# Patient Record
Sex: MALE | Race: BLACK OR AFRICAN AMERICAN | NOT HISPANIC OR LATINO | Employment: OTHER | URBAN - METROPOLITAN AREA
[De-identification: names, ages, dates, MRNs, and addresses within clinical notes are randomized per-mention and may not be internally consistent; named-entity substitution may affect disease eponyms.]

---

## 2017-08-27 ENCOUNTER — HOSPITAL ENCOUNTER (EMERGENCY)
Facility: HOSPITAL | Age: 52
Discharge: OTHER INSTITUTION | End: 2017-08-28
Attending: EMERGENCY MEDICINE
Payer: MEDICAID

## 2017-08-27 DIAGNOSIS — R45.851 SUICIDAL IDEATION: Primary | ICD-10-CM

## 2017-08-27 DIAGNOSIS — E87.6 HYPOKALEMIA: ICD-10-CM

## 2017-08-27 DIAGNOSIS — F10.10 ALCOHOL ABUSE: ICD-10-CM

## 2017-08-27 PROCEDURE — 96372 THER/PROPH/DIAG INJ SC/IM: CPT

## 2017-08-27 PROCEDURE — 99285 EMERGENCY DEPT VISIT HI MDM: CPT | Mod: 25

## 2017-08-27 RX ORDER — OLANZAPINE 10 MG/2ML
10 INJECTION, POWDER, FOR SOLUTION INTRAMUSCULAR
Status: COMPLETED | OUTPATIENT
Start: 2017-08-28 | End: 2017-08-28

## 2017-08-27 RX ORDER — HALOPERIDOL 10 MG/1
10 TABLET ORAL 4 TIMES DAILY
Status: ON HOLD | COMMUNITY
End: 2017-10-24 | Stop reason: HOSPADM

## 2017-08-27 RX ORDER — DIPHENHYDRAMINE HYDROCHLORIDE 50 MG/ML
50 INJECTION INTRAMUSCULAR; INTRAVENOUS
Status: COMPLETED | OUTPATIENT
Start: 2017-08-28 | End: 2017-08-28

## 2017-08-28 VITALS
WEIGHT: 210 LBS | HEART RATE: 64 BPM | RESPIRATION RATE: 18 BRPM | TEMPERATURE: 98 F | DIASTOLIC BLOOD PRESSURE: 90 MMHG | HEIGHT: 69 IN | OXYGEN SATURATION: 100 % | BODY MASS INDEX: 31.1 KG/M2 | SYSTOLIC BLOOD PRESSURE: 150 MMHG

## 2017-08-28 LAB
ALBUMIN SERPL BCP-MCNC: 3.7 G/DL
ALP SERPL-CCNC: 93 U/L
ALT SERPL W/O P-5'-P-CCNC: 125 U/L
AMPHET+METHAMPHET UR QL: NEGATIVE
ANION GAP SERPL CALC-SCNC: 11 MMOL/L
APAP SERPL-MCNC: <3 UG/ML
AST SERPL-CCNC: 125 U/L
BARBITURATES UR QL SCN>200 NG/ML: NEGATIVE
BASOPHILS # BLD AUTO: 0.03 K/UL
BASOPHILS NFR BLD: 0.3 %
BENZODIAZ UR QL SCN>200 NG/ML: NEGATIVE
BILIRUB SERPL-MCNC: 0.3 MG/DL
BILIRUB UR QL STRIP: NEGATIVE
BUN SERPL-MCNC: 9 MG/DL
BZE UR QL SCN: NEGATIVE
CALCIUM SERPL-MCNC: 10.5 MG/DL
CANNABINOIDS UR QL SCN: NEGATIVE
CHLORIDE SERPL-SCNC: 106 MMOL/L
CLARITY UR: CLEAR
CO2 SERPL-SCNC: 18 MMOL/L
COLOR UR: YELLOW
CREAT SERPL-MCNC: 0.9 MG/DL
CREAT UR-MCNC: 202.8 MG/DL
DIFFERENTIAL METHOD: NORMAL
EOSINOPHIL # BLD AUTO: 0.1 K/UL
EOSINOPHIL NFR BLD: 0.5 %
ERYTHROCYTE [DISTWIDTH] IN BLOOD BY AUTOMATED COUNT: 13.1 %
EST. GFR  (AFRICAN AMERICAN): >60 ML/MIN/1.73 M^2
EST. GFR  (NON AFRICAN AMERICAN): >60 ML/MIN/1.73 M^2
ETHANOL SERPL-MCNC: 210 MG/DL
ETHANOL SERPL-MCNC: 77 MG/DL
GLUCOSE SERPL-MCNC: 104 MG/DL
GLUCOSE UR QL STRIP: NEGATIVE
HCT VFR BLD AUTO: 45.7 %
HGB BLD-MCNC: 15.5 G/DL
HGB UR QL STRIP: NEGATIVE
KETONES UR QL STRIP: NEGATIVE
LEUKOCYTE ESTERASE UR QL STRIP: NEGATIVE
LYMPHOCYTES # BLD AUTO: 3.7 K/UL
LYMPHOCYTES NFR BLD: 33.3 %
MCH RBC QN AUTO: 30.8 PG
MCHC RBC AUTO-ENTMCNC: 33.9 G/DL
MCV RBC AUTO: 91 FL
METHADONE UR QL SCN>300 NG/ML: NEGATIVE
MONOCYTES # BLD AUTO: 0.8 K/UL
MONOCYTES NFR BLD: 7.4 %
NEUTROPHILS # BLD AUTO: 6.5 K/UL
NEUTROPHILS NFR BLD: 58.5 %
NITRITE UR QL STRIP: NEGATIVE
OPIATES UR QL SCN: NEGATIVE
PCP UR QL SCN>25 NG/ML: NEGATIVE
PH UR STRIP: 6 [PH] (ref 5–8)
PLATELET # BLD AUTO: 309 K/UL
PMV BLD AUTO: 10.1 FL
POTASSIUM SERPL-SCNC: 3.2 MMOL/L
PROT SERPL-MCNC: 9.4 G/DL
PROT UR QL STRIP: NEGATIVE
RBC # BLD AUTO: 5.04 M/UL
SODIUM SERPL-SCNC: 135 MMOL/L
SP GR UR STRIP: >=1.03 (ref 1–1.03)
TOXICOLOGY INFORMATION: NORMAL
TSH SERPL DL<=0.005 MIU/L-ACNC: 0.9 UIU/ML
URN SPEC COLLECT METH UR: ABNORMAL
UROBILINOGEN UR STRIP-ACNC: 1 EU/DL
WBC # BLD AUTO: 11.13 K/UL

## 2017-08-28 PROCEDURE — 80320 DRUG SCREEN QUANTALCOHOLS: CPT | Mod: 91

## 2017-08-28 PROCEDURE — 80329 ANALGESICS NON-OPIOID 1 OR 2: CPT

## 2017-08-28 PROCEDURE — 25000003 PHARM REV CODE 250: Performed by: EMERGENCY MEDICINE

## 2017-08-28 PROCEDURE — 80053 COMPREHEN METABOLIC PANEL: CPT

## 2017-08-28 PROCEDURE — S0166 INJ OLANZAPINE 2.5MG: HCPCS | Performed by: EMERGENCY MEDICINE

## 2017-08-28 PROCEDURE — 99284 EMERGENCY DEPT VISIT MOD MDM: CPT | Mod: GT,S$PBB,, | Performed by: PSYCHIATRY & NEUROLOGY

## 2017-08-28 PROCEDURE — 85025 COMPLETE CBC W/AUTO DIFF WBC: CPT

## 2017-08-28 PROCEDURE — 84443 ASSAY THYROID STIM HORMONE: CPT

## 2017-08-28 PROCEDURE — 80307 DRUG TEST PRSMV CHEM ANLYZR: CPT

## 2017-08-28 PROCEDURE — 81003 URINALYSIS AUTO W/O SCOPE: CPT

## 2017-08-28 PROCEDURE — 80320 DRUG SCREEN QUANTALCOHOLS: CPT

## 2017-08-28 PROCEDURE — 63600175 PHARM REV CODE 636 W HCPCS: Performed by: EMERGENCY MEDICINE

## 2017-08-28 RX ORDER — DIAZEPAM 5 MG/1
10 TABLET ORAL EVERY 8 HOURS
Status: DISCONTINUED | OUTPATIENT
Start: 2017-08-28 | End: 2017-08-28 | Stop reason: HOSPADM

## 2017-08-28 RX ORDER — THIAMINE HCL 100 MG
100 TABLET ORAL DAILY
Status: DISCONTINUED | OUTPATIENT
Start: 2017-08-28 | End: 2017-08-28 | Stop reason: HOSPADM

## 2017-08-28 RX ORDER — QUETIAPINE FUMARATE 100 MG/1
100 TABLET, FILM COATED ORAL NIGHTLY
Status: DISCONTINUED | OUTPATIENT
Start: 2017-08-28 | End: 2017-08-28 | Stop reason: HOSPADM

## 2017-08-28 RX ORDER — POTASSIUM CHLORIDE 750 MG/1
10 TABLET, EXTENDED RELEASE ORAL
Status: COMPLETED | OUTPATIENT
Start: 2017-08-28 | End: 2017-08-28

## 2017-08-28 RX ORDER — LORAZEPAM 1 MG/1
1 TABLET ORAL EVERY 4 HOURS PRN
Status: DISCONTINUED | OUTPATIENT
Start: 2017-08-28 | End: 2017-08-28

## 2017-08-28 RX ADMIN — DIAZEPAM 10 MG: 5 TABLET ORAL at 03:08

## 2017-08-28 RX ADMIN — POTASSIUM CHLORIDE 10 MEQ: 750 TABLET, EXTENDED RELEASE ORAL at 10:08

## 2017-08-28 RX ADMIN — DIPHENHYDRAMINE HYDROCHLORIDE 50 MG: 50 INJECTION, SOLUTION INTRAMUSCULAR; INTRAVENOUS at 12:08

## 2017-08-28 RX ADMIN — OLANZAPINE 10 MG: 10 INJECTION, POWDER, FOR SOLUTION INTRAMUSCULAR at 12:08

## 2017-08-28 RX ADMIN — LORAZEPAM 2 MG: 2 INJECTION INTRAMUSCULAR; INTRAVENOUS at 12:08

## 2017-08-28 RX ADMIN — Medication 100 MG: at 03:08

## 2017-08-28 NOTE — ED NOTES
Belongings Labeled with name and placed in bag  Shirt,shorts,has card in pocket,socks ,shoes.  Placed in locker room by Zulema Locker #2

## 2017-08-28 NOTE — ED NOTES
Pt's room secured per protocol. Pt's belongings secured and pt placed in grey gown and yellow socks. Pt being directly monitored by siri Ramsey at this time. Will continue to monitor.  Sleeping soundly

## 2017-08-28 NOTE — PROVIDER PROGRESS NOTES - EMERGENCY DEPT.
4:33 PM:  Patient is being transferred.  Accepting Facility: [facility]  Accepting Physician: Ole Art

## 2017-08-28 NOTE — ED NOTES
Acknowledge transfer of care of patient. Patient resting in bed with no acute distress noted. Patient sleeping, easily aroused. Alert and oriented x 3, [place] and follows commands. No complaints of altered comfort. Respirations easy and unlabored.. Able to make needs known. Bed in low position. Room remains cleared per PEC protocol and patient in cool scrubs. Hannah COKER assisting with observations. Patient remains SI. Will continue to monitor. Patient aware of need for urine specimen.

## 2017-08-28 NOTE — ED PROVIDER NOTES
SCRIBE #1 NOTE: I, Garry Caruso, am scribing for, and in the presence of, Stoney Barron Jr., MD. I have scribed the entire note.      History      Chief Complaint   Patient presents with    Suicidal     SI/HI reports he was going to hurt himself with a gun and his brother in law too.       Review of patient's allergies indicates:  No Known Allergies     HPI   HPI    8/28/2017, 12:29 AM   History obtained from the patient      History of Present Illness: Matthew Preston is a 51 y.o. male patient who presents to the Emergency Department for SI which onset 8 hours PTA. Symptoms are constant and moderate in severity. Pt states he was going to hurt himself and his brother in law with a gun. No mitigating or exacerbating factors reported. Associated sxs include HI. Patient denies any fever, chills, n/v/d, hallucinations, ETOH use, IV drug use, and all other sxs at this time. No further complaints or concerns at this time.         Arrival mode: Personal vehicle    PCP: Primary Doctor No       Past Medical History:  Past Medical History:   Diagnosis Date    Depression     History of psychiatric care     History of psychiatric hospitalization     Psychiatric exam requested by authority     Psychiatric problem     Schizophrenia     Therapy        Past Surgical History:  History reviewed. No pertinent surgical history.      Family History:  Unknown    Social History:  Social History     Social History Main Topics    Smoking status: Current Every Day Smoker     Packs/day: 1.50     Years: 15.00     Types: Cigarettes    Smokeless tobacco: Never Used    Alcohol use Yes      Comment: 2 fifths a day.     Drug use: No    Sexual activity: Not Currently       ROS   Review of Systems   Constitutional: Negative for chills and fever.   HENT: Negative for sore throat.    Respiratory: Negative for shortness of breath.    Cardiovascular: Negative for chest pain.   Gastrointestinal: Negative for diarrhea, nausea and vomiting.    Genitourinary: Negative for dysuria.   Musculoskeletal: Negative for back pain.   Skin: Negative for rash.   Neurological: Negative for weakness.   Hematological: Does not bruise/bleed easily.   Psychiatric/Behavioral: Positive for suicidal ideas. Negative for hallucinations.        + HI  - ETOH use  - IV drug use       Physical Exam      Initial Vitals [08/27/17 2332]   BP Pulse Resp Temp SpO2   (!) 143/98 (!) 124 18 98.6 °F (37 °C) 100 %      MAP       113          Physical Exam  Nursing Notes and Vital Signs Reviewed.  Constitutional: Patient is in no acute distress. Well-developed and well-nourished.  Head: Atraumatic. Normocephalic.  Eyes: PERRL. EOM intact. Conjunctivae are not pale. No scleral icterus.  ENT: Mucous membranes are moist. Oropharynx is clear and symmetric.    Neck: Supple. Full ROM. No lymphadenopathy.  Cardiovascular: Regular rate. Regular rhythm. No murmurs, rubs, or gallops. Distal pulses are 2+ and symmetric.  Pulmonary/Chest: No respiratory distress. Clear to auscultation bilaterally. No wheezing, rales, or rhonchi.  Abdominal: Soft and non-distended.  There is no tenderness.  No rebound, guarding, or rigidity.   Musculoskeletal: Moves all extremities. No obvious deformities. No edema.  Skin: Warm and dry.  Neurological:  Alert, awake, and appropriate.  Normal speech.  No acute focal neurological deficits are appreciated.  Psychiatric:               Behavior: normal, cooperative              Mood and Affect: depressed affect              Thought Process: within normal limits              Suicidal Ideations: Yes              Suicidal Plan: Specific plan to harm self.  Kill himself and brother in law with a gun              Homicidal Ideations: Yes              Hallucinations: none      ED Course    Procedures  ED Vital Signs:  Vitals:    08/27/17 2332 08/28/17 0031   BP: (!) 143/98 127/77   Pulse: (!) 124 91   Resp: 18    Temp: 98.6 °F (37 °C)    TempSrc: Oral    SpO2: 100% 95%   Weight:  "95.3 kg (210 lb)    Height: 5' 9" (1.753 m)        Abnormal Lab Results:  Labs Reviewed   COMPREHENSIVE METABOLIC PANEL - Abnormal; Notable for the following:        Result Value    Sodium 135 (*)     Potassium 3.2 (*)     CO2 18 (*)     Total Protein 9.4 (*)      (*)      (*)     All other components within normal limits   ALCOHOL,MEDICAL (ETHANOL) - Abnormal; Notable for the following:     Alcohol, Medical, Serum 210 (*)     All other components within normal limits   ACETAMINOPHEN LEVEL - Abnormal; Notable for the following:     Acetaminophen (Tylenol), Serum <3.0 (*)     All other components within normal limits   CBC W/ AUTO DIFFERENTIAL   TSH   URINALYSIS   DRUG SCREEN PANEL, URINE EMERGENCY   ALCOHOL,MEDICAL (ETHANOL)        All Lab Results:  Results for orders placed or performed during the hospital encounter of 08/27/17   CBC auto differential   Result Value Ref Range    WBC 11.13 3.90 - 12.70 K/uL    RBC 5.04 4.60 - 6.20 M/uL    Hemoglobin 15.5 14.0 - 18.0 g/dL    Hematocrit 45.7 40.0 - 54.0 %    MCV 91 82 - 98 fL    MCH 30.8 27.0 - 31.0 pg    MCHC 33.9 32.0 - 36.0 g/dL    RDW 13.1 11.5 - 14.5 %    Platelets 309 150 - 350 K/uL    MPV 10.1 9.2 - 12.9 fL    Gran # 6.5 1.8 - 7.7 K/uL    Lymph # 3.7 1.0 - 4.8 K/uL    Mono # 0.8 0.3 - 1.0 K/uL    Eos # 0.1 0.0 - 0.5 K/uL    Baso # 0.03 0.00 - 0.20 K/uL    Gran% 58.5 38.0 - 73.0 %    Lymph% 33.3 18.0 - 48.0 %    Mono% 7.4 4.0 - 15.0 %    Eosinophil% 0.5 0.0 - 8.0 %    Basophil% 0.3 0.0 - 1.9 %    Differential Method Automated    Comprehensive metabolic panel   Result Value Ref Range    Sodium 135 (L) 136 - 145 mmol/L    Potassium 3.2 (L) 3.5 - 5.1 mmol/L    Chloride 106 95 - 110 mmol/L    CO2 18 (L) 23 - 29 mmol/L    Glucose 104 70 - 110 mg/dL    BUN, Bld 9 6 - 20 mg/dL    Creatinine 0.9 0.5 - 1.4 mg/dL    Calcium 10.5 8.7 - 10.5 mg/dL    Total Protein 9.4 (H) 6.0 - 8.4 g/dL    Albumin 3.7 3.5 - 5.2 g/dL    Total Bilirubin 0.3 0.1 - 1.0 mg/dL    " Alkaline Phosphatase 93 55 - 135 U/L     (H) 10 - 40 U/L     (H) 10 - 44 U/L    Anion Gap 11 8 - 16 mmol/L    eGFR if African American >60 >60 mL/min/1.73 m^2    eGFR if non African American >60 >60 mL/min/1.73 m^2   TSH   Result Value Ref Range    TSH 0.898 0.400 - 4.000 uIU/mL   Ethanol   Result Value Ref Range    Alcohol, Medical, Serum 210 (H) <10 mg/dL   Acetaminophen level   Result Value Ref Range    Acetaminophen (Tylenol), Serum <3.0 (L) 10.0 - 20.0 ug/mL                  The Emergency Provider reviewed the vital signs and test results, which are outlined above.    ED Discussion     2:00 AM: The PEC hold has been issued by Dr. Barron at this time for SI.    3:39 AM: Pt has been medically cleared by Dr. Barron at this time. Reassessed pt at this time. Pt is resting comfortably and appears in no acute distress. There are no psychiatric services offered at this facility. D/w pt all pertinent ED information and plan to transfer to psychiatric facility for psychiatric treatment. Pt verbalizes understanding. Pt will be transported by personnel trained in CPR and CPI. All questions and complaints have been addressed at this time. Pt condition is stable at this time and is clear to transfer to psychiatric facility at this time.         ED Medication(s):  Medications   lorazepam (ATIVAN) injection 2 mg (2 mg Intramuscular Given 8/28/17 0018)   diphenhydrAMINE injection 50 mg (50 mg Intramuscular Given 8/28/17 0013)   olanzapine injection 10 mg (10 mg Intramuscular Given 8/28/17 0013)       New Prescriptions    No medications on file             Medical Decision Making    Medical Decision Making:   Clinical Tests:   Lab Tests: Ordered and Reviewed           Scribe Attestation:   Scribe #1: I performed the above scribed service and the documentation accurately describes the services I performed. I attest to the accuracy of the note.    Attending:   Physician Attestation Statement for Scribe #1: Stoney TAYLOR  Beatrice Wood MD, personally performed the services described in this documentation, as scribed by Garry Caruso, in my presence, and it is both accurate and complete.          Clinical Impression       ICD-10-CM ICD-9-CM   1. Suicidal ideation R45.851 V62.84   2. Alcohol abuse F10.10 305.00       Disposition:   Disposition: Transferred  Condition: Stable         Stoney Barron Jr., MD  08/28/17 0427

## 2017-08-28 NOTE — ED NOTES
PEC received in [facility].  Admit packet faxed to: [facility], [facility], , [facility], [facility], [facility], [facility], [facility], [facility], [facility], [facility], [facility], [facility]. Waiting for response.

## 2017-08-28 NOTE — PROVIDER PROGRESS NOTES - EMERGENCY DEPT.
Encounter Date: 8/27/2017    ED Physician Progress Notes       SCRIBE NOTE: IGuanaco, am scribing for, and in the presence of,  Tiffany Hand DO.  Physician Statement: ITiffany DO, personally performed the services described in this documentation as scribed by Guanaco Eli in my presence, and it is both accurate and complete.     Physician Note:   8:46 AM: Pt has been medically cleared by Dr. Hand at this time. Pt has a 3.2 potassium and elevated liver enzymes. Pt has a Hx of elevated liver enzymes. Reassessed pt at this time. Pt is resting comfortably and appears in no acute distress. On examination, lungs are clear to auscultation. There are no psychiatric services offered at this facility. D/w pt all pertinent ED information and plan to transfer to psychiatric facility for psychiatric treatment. Pt verbalizes understanding. Patient being transferred by [facility] for ongoing personal protection en route. Pt will be transported by personnel trained in CPR and CPI. All questions and complaints have been addressed at this time. Pt condition is stable at this time and is clear to transfer to psychiatric facility at this time.

## 2017-08-28 NOTE — ED NOTES
Patient accepted by Ro at [facility] ([address] [facility], [place]) for the service of Ole Art NP.  Report to be called to [phone].

## 2017-08-28 NOTE — CONSULTS
"Tele-Consultation to Emergency Department from Psychiatry    Please see previous notes:    From current presentation:  SI/HI reports he was going to hurt himself with a gun and his brother in law too.    From 14:   Matthew Preston is a 48 y.o. male with chronic drug and alcohol abuse presenting to the ED by Police for psychiatric evaluation. Patient was recently released a few hours PTA from longterm for abusing 911.  Patient is a chronic abuser of the system and consistently comes to the ED after running out of cocaine. Patient reports that last time he used cocaine was 3 days ago. Patient states he drank 3/4 of a forty ounce beer tonight. After questioning patient on suicidal tendencies, the patient broke down and stated that he just ran out of money and cocaine and just needs a place to stay tonight.     From 14:  Matthew Preston is a 48 y.o. male with hx of mental illness presenting to the ED for phychiatric evaluation. Pt reports that he is experiencing HI. Pt states that "he wants to kill his mother". Sx have been constant and moderate in severity. No mitigating or exacerbating factors. Associated sx include SI and depression. Pt denies any hallucinations or fever. Pt reports that he is currently out of his phychiatric medication and has not been taking them for quite some time. Pt reports that he would like to be PECd and sent to a group home.    Please see psychiatric discharge summary from 14.    Patient agreeable to consultation via telepsychiatry.    Consultation started: 2017 at 9:30 am  The chief complaint leading to psychiatric consultation is: SI and HI  The location of the consulting psychiatrist is [address] [place] [address].  The patient location is [facility].    Patient Identification:  Matthew Preston is a 51 y.o. male.    Patient information was obtained from patient.    History of Present Illness:  States, that he feels suicidal. Sister  on " "father's day this year. Has not been taking meds[Celexa 20 mg daily and Seroquel 600 mg daily], was feeling better, stopped taking meds 2.5 weeks ago. [place]  sister's voice calling his name last night. Continues to hear  sister's voice telling him to join her[kill himself].    Pt. Does not want me to call any source of collateral info. Does not want me to call his mother: "she's already upset".    Psychiatric History:   Hospitalization: Yes, most recently in   Medication Trials: Yes, Celexa, Seroquel  Suicide Attempts: put gun to head, ran into traffic years ago  Violence: denies  Depression: yes  Madeline: states, that he has episodes of elevated mood for 1-2 days  AH's: yes  Delusions: denies    Review of Systems:  Denies any current physical complaint    Past Medical History:   Past Medical History:   Diagnosis Date    Depression     History of psychiatric care     History of psychiatric hospitalization     Psychiatric exam requested by authority     Psychiatric problem     Schizophrenia     Therapy       Seizures: reports h/o withdrawal seizure a few years ago  Head trauma/l.o.c.: denies head trauma with l.o.c.    Allergies:   Review of patient's allergies indicates:  No Known Allergies    Medications at home: no    Substance Abuse History:   Alchohol: drinks a fifth and a half and a twelve pack of beer daily, reports h/o seizure, shakes and DT's  Drug: last used crack cocaine 2 months ago    Legal History:   Past charges/incarcerations: incarcerated for 6 months[child support]  Pending charges: no    Family Psychiatric History:   denies    Social History:   History of Physical/Sexual Abuse: no  Education: 9th grade    Employment/Disability: last worked in  as    Financial: receives disability  Relationship Status/Sexual Orientation: currently not in a relationship  Children: 6 adult children   Housing Status: lives in group home  Scientology: believes in God   " "History: no   Recreational Activities: fishing, football  Access to Gun: no     Current Evaluation:     Constitutional  Vitals:  Vitals:    17 2332 17 0031 17 0611 17 0747   BP: (!) 143/98 127/77 100/60 (!) 143/93   Pulse: (!) 124 91 88 98   Resp: 18  18    Temp: 98.6 °F (37 °C)      TempSrc: Oral      SpO2: 100% 95% 99% 97%   Weight: 95.3 kg (210 lb)      Height: 5' 9" (1.753 m)         General:  unremarkable, age appropriate     Musculoskeletal  Muscle Strength/Tone:   moving arms normally   Gait & Station:   sitting on stretcher     Psychiatric  Level of Consciousness: alert  Orientation: oriented to person, place and time  Grooming: in hospital gown  Psychomotor Behavior: no agitation  Speech: normal in rate, rhythm and volume  Language: uses words appropriately  Mood: depressed  Affect: constricted  Thought Process: logical, goal directed  Associations: intact  Thought Content: SI to shoot self, wants to kill brother in law Rah[ of  sister]  Memory: grossly intact  Attention: intact to interview  Fund of Knowledge: appears adequate  Insight: appears limited  Judgement: appears limited    Relevant Elements of Neurological Exam: no abnormality of posture noted    Assessment - Diagnosis - Goals:     Diagnosis/Impression:   Depressive d/o, unspecified, with 's  Pt. Reports suicidal ideation and homicidal ideation toward Rah, the  of his  sister  Alcohol Use  Alcohol Intoxication  Serum alcohol on presentation 210        Rec:   - medical clearance  - follow LFT's  - PEC and psychiatric hospitalization  - Seroquel 100 mg at bedtime[risks including tardive dyskinesia and benefits d/w pt.]  - consider starting Valium taper at 10 mg q8h  - Thiamine, Folate, MVI  - Haldol/Benadryl/Ativan p.o./i.m. Prn for agitation    Time with patient: 20 min    More than 50% of the time was spent counseling/coordinating care    Laboratory Data:   Labs Reviewed "   COMPREHENSIVE METABOLIC PANEL - Abnormal; Notable for the following:        Result Value    Sodium 135 (*)     Potassium 3.2 (*)     CO2 18 (*)     Total Protein 9.4 (*)      (*)      (*)     All other components within normal limits   URINALYSIS - Abnormal; Notable for the following:     Specific Gravity, UA >=1.030 (*)     All other components within normal limits   ALCOHOL,MEDICAL (ETHANOL) - Abnormal; Notable for the following:     Alcohol, Medical, Serum 210 (*)     All other components within normal limits   ACETAMINOPHEN LEVEL - Abnormal; Notable for the following:     Acetaminophen (Tylenol), Serum <3.0 (*)     All other components within normal limits   ALCOHOL,MEDICAL (ETHANOL) - Abnormal; Notable for the following:     Alcohol, Medical, Serum 77 (*)     All other components within normal limits   CBC W/ AUTO DIFFERENTIAL   TSH   DRUG SCREEN PANEL, URINE EMERGENCY

## 2017-08-29 NOTE — ED NOTES
Report received from Garry Hendrickson RN.  Patient resting quietly in bed.  PEC protocol remains in place with MARK Santana at bedside providing direct observation.  Awaiting [facility] transport for transfer to psychiatric facility.

## 2017-08-29 NOTE — ED NOTES
Called and spoke to Kayla at [facility]; informed them that patient has left with [facility] and is in route to their facility.

## 2017-08-29 NOTE — ED NOTES
Patient changed into paper scrubs.  Patient up to void in bathroom, prior to departure. Vitals taken and are stable.

## 2017-10-23 PROBLEM — F32.A DEPRESSION: Status: ACTIVE | Noted: 2017-10-23

## 2017-10-25 PROBLEM — E11.9 TYPE 2 DIABETES MELLITUS WITHOUT COMPLICATION, WITHOUT LONG-TERM CURRENT USE OF INSULIN: Status: ACTIVE | Noted: 2017-10-25

## 2019-12-01 PROBLEM — F10.10 ALCOHOL USE DISORDER, MILD, ABUSE: Status: ACTIVE | Noted: 2019-12-01

## 2019-12-01 PROBLEM — F15.90 STIMULANT USE DISORDER: Status: ACTIVE | Noted: 2019-12-01

## 2019-12-01 PROBLEM — F25.0 SCHIZOAFFECTIVE DISORDER, BIPOLAR TYPE: Status: ACTIVE | Noted: 2019-12-01

## 2019-12-02 PROBLEM — R45.851 SUICIDAL IDEATIONS: Status: ACTIVE | Noted: 2019-12-02

## 2019-12-09 PROBLEM — R45.851 SUICIDAL IDEATIONS: Status: RESOLVED | Noted: 2019-12-02 | Resolved: 2019-12-09

## 2021-12-27 ENCOUNTER — TELEPHONE (OUTPATIENT)
Dept: INTERNAL MEDICINE | Facility: CLINIC | Age: 56
End: 2021-12-27

## 2025-02-03 ENCOUNTER — HOSPITAL ENCOUNTER (EMERGENCY)
Facility: HOSPITAL | Age: 60
Discharge: PSYCHIATRIC OR SUBSTANCE USE FACILITY | End: 2025-02-03
Attending: EMERGENCY MEDICINE
Payer: MEDICAID

## 2025-02-03 VITALS
OXYGEN SATURATION: 97 % | SYSTOLIC BLOOD PRESSURE: 144 MMHG | HEIGHT: 70 IN | RESPIRATION RATE: 19 BRPM | TEMPERATURE: 98 F | HEART RATE: 18 BPM | DIASTOLIC BLOOD PRESSURE: 88 MMHG

## 2025-02-03 DIAGNOSIS — R45.851 SUICIDAL IDEATIONS: Primary | ICD-10-CM

## 2025-02-03 LAB
ALBUMIN SERPL BCP-MCNC: 4.1 G/DL (ref 3.5–5.2)
ALP SERPL-CCNC: 76 U/L (ref 40–150)
ALT SERPL W/O P-5'-P-CCNC: 36 U/L (ref 10–44)
AMPHET+METHAMPHET UR QL: NEGATIVE
ANION GAP SERPL CALC-SCNC: 13 MMOL/L (ref 8–16)
APAP SERPL-MCNC: <3 UG/ML (ref 10–20)
AST SERPL-CCNC: 32 U/L (ref 10–40)
BACTERIA #/AREA URNS HPF: NORMAL /HPF
BARBITURATES UR QL SCN>200 NG/ML: NEGATIVE
BASOPHILS # BLD AUTO: 0.08 K/UL (ref 0–0.2)
BASOPHILS NFR BLD: 0.9 % (ref 0–1.9)
BENZODIAZ UR QL SCN>200 NG/ML: ABNORMAL
BILIRUB SERPL-MCNC: 0.4 MG/DL (ref 0.1–1)
BILIRUB UR QL STRIP: ABNORMAL
BUN SERPL-MCNC: 13 MG/DL (ref 6–20)
BZE UR QL SCN: NEGATIVE
CALCIUM SERPL-MCNC: 10 MG/DL (ref 8.7–10.5)
CANNABINOIDS UR QL SCN: NEGATIVE
CHLORIDE SERPL-SCNC: 106 MMOL/L (ref 95–110)
CLARITY UR: CLEAR
CO2 SERPL-SCNC: 22 MMOL/L (ref 23–29)
COLOR UR: YELLOW
CREAT SERPL-MCNC: 0.8 MG/DL (ref 0.5–1.4)
CREAT UR-MCNC: >450 MG/DL (ref 23–375)
DIFFERENTIAL METHOD BLD: ABNORMAL
EOSINOPHIL # BLD AUTO: 0.1 K/UL (ref 0–0.5)
EOSINOPHIL NFR BLD: 0.9 % (ref 0–8)
ERYTHROCYTE [DISTWIDTH] IN BLOOD BY AUTOMATED COUNT: 13.1 % (ref 11.5–14.5)
EST. GFR  (NO RACE VARIABLE): >60 ML/MIN/1.73 M^2
ETHANOL SERPL-MCNC: <10 MG/DL
GLUCOSE SERPL-MCNC: 73 MG/DL (ref 70–110)
GLUCOSE UR QL STRIP: NEGATIVE
HCT VFR BLD AUTO: 44.5 % (ref 40–54)
HGB BLD-MCNC: 14.8 G/DL (ref 14–18)
HGB UR QL STRIP: NEGATIVE
HYALINE CASTS #/AREA URNS LPF: 0 /LPF
IMM GRANULOCYTES # BLD AUTO: 0.04 K/UL (ref 0–0.04)
IMM GRANULOCYTES NFR BLD AUTO: 0.4 % (ref 0–0.5)
KETONES UR QL STRIP: ABNORMAL
LEUKOCYTE ESTERASE UR QL STRIP: NEGATIVE
LYMPHOCYTES # BLD AUTO: 2.2 K/UL (ref 1–4.8)
LYMPHOCYTES NFR BLD: 24.6 % (ref 18–48)
MCH RBC QN AUTO: 31.1 PG (ref 27–31)
MCHC RBC AUTO-ENTMCNC: 33.3 G/DL (ref 32–36)
MCV RBC AUTO: 94 FL (ref 82–98)
METHADONE UR QL SCN>300 NG/ML: NEGATIVE
MICROSCOPIC COMMENT: NORMAL
MONOCYTES # BLD AUTO: 0.7 K/UL (ref 0.3–1)
MONOCYTES NFR BLD: 8.2 % (ref 4–15)
NEUTROPHILS # BLD AUTO: 5.9 K/UL (ref 1.8–7.7)
NEUTROPHILS NFR BLD: 65 % (ref 38–73)
NITRITE UR QL STRIP: NEGATIVE
NRBC BLD-RTO: 0 /100 WBC
OPIATES UR QL SCN: NEGATIVE
PCP UR QL SCN>25 NG/ML: NEGATIVE
PH UR STRIP: 6 [PH] (ref 5–8)
PLATELET # BLD AUTO: 225 K/UL (ref 150–450)
PMV BLD AUTO: 10.9 FL (ref 9.2–12.9)
POTASSIUM SERPL-SCNC: 3.8 MMOL/L (ref 3.5–5.1)
PROT SERPL-MCNC: 8.4 G/DL (ref 6–8.4)
PROT UR QL STRIP: ABNORMAL
RBC # BLD AUTO: 4.76 M/UL (ref 4.6–6.2)
RBC #/AREA URNS HPF: 2 /HPF (ref 0–4)
SARS-COV-2 RDRP RESP QL NAA+PROBE: NEGATIVE
SODIUM SERPL-SCNC: 141 MMOL/L (ref 136–145)
SP GR UR STRIP: >1.03 (ref 1–1.03)
TOXICOLOGY INFORMATION: ABNORMAL
URN SPEC COLLECT METH UR: ABNORMAL
UROBILINOGEN UR STRIP-ACNC: ABNORMAL EU/DL
WBC # BLD AUTO: 9.03 K/UL (ref 3.9–12.7)
WBC #/AREA URNS HPF: 2 /HPF (ref 0–5)

## 2025-02-03 PROCEDURE — 80307 DRUG TEST PRSMV CHEM ANLYZR: CPT | Performed by: EMERGENCY MEDICINE

## 2025-02-03 PROCEDURE — 99285 EMERGENCY DEPT VISIT HI MDM: CPT

## 2025-02-03 PROCEDURE — 80053 COMPREHEN METABOLIC PANEL: CPT | Performed by: EMERGENCY MEDICINE

## 2025-02-03 PROCEDURE — 87635 SARS-COV-2 COVID-19 AMP PRB: CPT | Performed by: EMERGENCY MEDICINE

## 2025-02-03 PROCEDURE — 80143 DRUG ASSAY ACETAMINOPHEN: CPT | Performed by: EMERGENCY MEDICINE

## 2025-02-03 PROCEDURE — 99205 OFFICE O/P NEW HI 60 MIN: CPT | Mod: AF,HB,95, | Performed by: PSYCHIATRY & NEUROLOGY

## 2025-02-03 PROCEDURE — 82077 ASSAY SPEC XCP UR&BREATH IA: CPT | Performed by: EMERGENCY MEDICINE

## 2025-02-03 PROCEDURE — 81000 URINALYSIS NONAUTO W/SCOPE: CPT | Mod: 59 | Performed by: EMERGENCY MEDICINE

## 2025-02-03 PROCEDURE — 85025 COMPLETE CBC W/AUTO DIFF WBC: CPT | Performed by: EMERGENCY MEDICINE

## 2025-02-03 NOTE — ED PROVIDER NOTES
"SCRIBE #1 NOTE: I, Leesa White, am scribing for, and in the presence of, Nilay Rivera MD. I have scribed the HPI/ROS/PEx    SCRIBE #2 NOTE: I, Ana Paula Garcia, am scribing for, and in the presence of,  Nawaf Hall MD. I have scribed the remaining portions of the note not scribed by Scribe #1.      History     Chief Complaint   Patient presents with    Psychiatric Evaluation     SI/Depression, out of meds x 1 month     Review of patient's allergies indicates:  No Known Allergies      History of Present Illness     HPI    2/3/2025, 2:54 PM  History obtained from the patient      History of Present Illness: Matthew Preston is a 59 y.o. male patient with a PMHx of schizophrenia and HTN who presents to the Emergency Department for evaluation of SI with no plan which onset gradually PTA. Pt lives in a group home; he states that he has not been taking his medications despite being given them by his group home. He states he has not been picking up his medications from the pharmacy either because he has started drinking again. He called for EMS to bring him here, so that he can get help. Symptoms are constant and moderate in severity. No mitigating or exacerbating factors reported. No associated sxs. Pt's L eye appears blind, when asked if this is acute he states that it isn't and that he can see out of it. Dr. Rivera holds up four fingers about a foot from his face covering his right eye and asked how many fingers she was holding up, pt was unable to answer correctly. He restates that he can see from his L eye, "he can see light." Patient denies any fever, AH/VH, agitation, self harm, and all other sxs at this time. No prior tx. No further complaints or concerns at this time.       Arrival mode: EMS    PCP: No, Primary Doctor        Past Medical History:  No past medical history on file.    Past Surgical History:  No past surgical history on file.      Family History:  No family history on file.    Social " History:  Social History     Tobacco Use    Smoking status: Not on file    Smokeless tobacco: Not on file   Substance and Sexual Activity    Alcohol use: Not on file    Drug use: Not on file    Sexual activity: Not on file        Review of Systems     Review of Systems   Constitutional:  Negative for fever.   HENT:  Negative for sore throat.    Eyes:  Negative for visual disturbance (states that he can see out of his L eye).   Respiratory:  Negative for shortness of breath.    Cardiovascular:  Negative for chest pain.   Gastrointestinal:  Negative for nausea.   Genitourinary:  Negative for dysuria.   Musculoskeletal:  Negative for back pain.   Skin:  Negative for rash.   Neurological:  Negative for weakness.   Hematological:  Does not bruise/bleed easily.   Psychiatric/Behavioral:  Positive for suicidal ideas. Negative for agitation, hallucinations (AH/VH) and self-injury.    All other systems reviewed and are negative.     Physical Exam     Initial Vitals [02/03/25 1400]   BP Pulse Resp Temp SpO2   (!) 170/98 86 16 98.4 °F (36.9 °C) 99 %      MAP       --          Physical Exam  Nursing Notes and Vital Signs Reviewed.  Constitutional: Patient is in no acute distress. Well-developed and well-nourished.  Head: Atraumatic. Normocephalic.  Eyes: PERRL. EOM intact. Conjunctivae are not pale. No scleral icterus. L eye is ulcerated; appears chronic, no drainage or cellulitis.   ENT: Mucous membranes are moist. Oropharynx is clear and symmetric.    Neck: Supple. Full ROM. No lymphadenopathy.  Cardiovascular: Regular rate. Regular rhythm. No murmurs, rubs, or gallops. Distal pulses are 2+ and symmetric.  Pulmonary/Chest: No respiratory distress. Clear to auscultation bilaterally. No wheezing or rales.  Abdominal: Soft and non-distended.  There is no tenderness.  No rebound, guarding, or rigidity. Good bowel sounds.  Genitourinary: No CVA tenderness  Musculoskeletal: Moves all extremities. No obvious deformities. No edema.  "No calf tenderness.  Skin: Warm and dry.  Neurological:  Alert, awake, and appropriate.  Normal speech.  No acute focal neurological deficits are appreciated.  Psychiatric: Depressed. Positive SI with no plan. Lives in a group home. Good eye contact. Appropriate in content.     ED Course   Procedures  ED Vital Signs:  Vitals:    02/03/25 1400 02/03/25 1752 02/03/25 2046   BP: (!) 170/98 (!) 158/68 (!) 144/88   Pulse: 86 78 (!) 18   Resp: 16 16 19   Temp: 98.4 °F (36.9 °C)  98.3 °F (36.8 °C)   TempSrc: Oral     SpO2: 99% 97%    Height: 5' 10" (1.778 m)         Abnormal Lab Results:  Labs Reviewed   URINALYSIS - Abnormal       Result Value    Specimen UA Urine, Clean Catch      Color, UA Yellow      Appearance, UA Clear      pH, UA 6.0      Specific Gravity, UA >1.030 (*)     Protein, UA 1+ (*)     Glucose, UA Negative      Ketones, UA 1+ (*)     Bilirubin (UA) 1+ (*)     Occult Blood UA Negative      Nitrite, UA Negative      Urobilinogen, UA 4.0-6.0 (*)     Leukocytes, UA Negative      Narrative:     Specimen Source->Urine   DRUG SCREEN PANEL, URINE EMERGENCY - Abnormal    Benzodiazepines Presumptive Positive (*)     Methadone metabolites Negative      Cocaine (Metab.) Negative      Opiate Scrn, Ur Negative      Barbiturate Screen, Ur Negative      Amphetamine Screen, Ur Negative      THC Negative      Phencyclidine Negative      Creatinine, Urine >450.0 (*)     Toxicology Information SEE COMMENT      Narrative:     Specimen Source->Urine   CBC W/ AUTO DIFFERENTIAL - Abnormal    WBC 9.03      RBC 4.76      Hemoglobin 14.8      Hematocrit 44.5      MCV 94      MCH 31.1 (*)     MCHC 33.3      RDW 13.1      Platelets 225      MPV 10.9      Immature Granulocytes 0.4      Gran # (ANC) 5.9      Immature Grans (Abs) 0.04      Lymph # 2.2      Mono # 0.7      Eos # 0.1      Baso # 0.08      nRBC 0      Gran % 65.0      Lymph % 24.6      Mono % 8.2      Eosinophil % 0.9      Basophil % 0.9      Differential Method Automated  "    COMPREHENSIVE METABOLIC PANEL - Abnormal    Sodium 141      Potassium 3.8      Chloride 106      CO2 22 (*)     Glucose 73      BUN 13      Creatinine 0.8      Calcium 10.0      Total Protein 8.4      Albumin 4.1      Total Bilirubin 0.4      Alkaline Phosphatase 76      AST 32      ALT 36      eGFR >60      Anion Gap 13     ACETAMINOPHEN LEVEL - Abnormal    Acetaminophen (Tylenol), Serum <3.0 (*)    ALCOHOL,MEDICAL (ETHANOL)    Alcohol, Serum <10     SARS-COV-2 RNA AMPLIFICATION, QUAL    SARS-CoV-2 RNA, Amplification, Qual Negative     URINALYSIS MICROSCOPIC    RBC, UA 2      WBC, UA 2      Bacteria None      Hyaline Casts, UA 0      Microscopic Comment SEE COMMENT      Narrative:     Specimen Source->Urine        All Lab Results:  Results for orders placed or performed during the hospital encounter of 02/03/25   Urinalysis    Collection Time: 02/03/25  2:43 PM   Result Value Ref Range    Specimen UA Urine, Clean Catch     Color, UA Yellow Yellow, Straw, Radha    Appearance, UA Clear Clear    pH, UA 6.0 5.0 - 8.0    Specific Gravity, UA >1.030 (A) 1.005 - 1.030    Protein, UA 1+ (A) Negative    Glucose, UA Negative Negative    Ketones, UA 1+ (A) Negative    Bilirubin (UA) 1+ (A) Negative    Occult Blood UA Negative Negative    Nitrite, UA Negative Negative    Urobilinogen, UA 4.0-6.0 (A) <2.0 EU/dL    Leukocytes, UA Negative Negative   Drug screen panel, emergency    Collection Time: 02/03/25  2:43 PM   Result Value Ref Range    Benzodiazepines Presumptive Positive (A) Negative    Methadone metabolites Negative Negative    Cocaine (Metab.) Negative Negative    Opiate Scrn, Ur Negative Negative    Barbiturate Screen, Ur Negative Negative    Amphetamine Screen, Ur Negative Negative    THC Negative Negative    Phencyclidine Negative Negative    Creatinine, Urine >450.0 (H) 23.0 - 375.0 mg/dL    Toxicology Information SEE COMMENT    Urinalysis Microscopic    Collection Time: 02/03/25  2:43 PM   Result Value Ref  Range    RBC, UA 2 0 - 4 /hpf    WBC, UA 2 0 - 5 /hpf    Bacteria None None-Occ /hpf    Hyaline Casts, UA 0 0-1/lpf /lpf    Microscopic Comment SEE COMMENT    CBC auto differential    Collection Time: 02/03/25  4:11 PM   Result Value Ref Range    WBC 9.03 3.90 - 12.70 K/uL    RBC 4.76 4.60 - 6.20 M/uL    Hemoglobin 14.8 14.0 - 18.0 g/dL    Hematocrit 44.5 40.0 - 54.0 %    MCV 94 82 - 98 fL    MCH 31.1 (H) 27.0 - 31.0 pg    MCHC 33.3 32.0 - 36.0 g/dL    RDW 13.1 11.5 - 14.5 %    Platelets 225 150 - 450 K/uL    MPV 10.9 9.2 - 12.9 fL    Immature Granulocytes 0.4 0.0 - 0.5 %    Gran # (ANC) 5.9 1.8 - 7.7 K/uL    Immature Grans (Abs) 0.04 0.00 - 0.04 K/uL    Lymph # 2.2 1.0 - 4.8 K/uL    Mono # 0.7 0.3 - 1.0 K/uL    Eos # 0.1 0.0 - 0.5 K/uL    Baso # 0.08 0.00 - 0.20 K/uL    nRBC 0 0 /100 WBC    Gran % 65.0 38.0 - 73.0 %    Lymph % 24.6 18.0 - 48.0 %    Mono % 8.2 4.0 - 15.0 %    Eosinophil % 0.9 0.0 - 8.0 %    Basophil % 0.9 0.0 - 1.9 %    Differential Method Automated    Comprehensive metabolic panel    Collection Time: 02/03/25  4:11 PM   Result Value Ref Range    Sodium 141 136 - 145 mmol/L    Potassium 3.8 3.5 - 5.1 mmol/L    Chloride 106 95 - 110 mmol/L    CO2 22 (L) 23 - 29 mmol/L    Glucose 73 70 - 110 mg/dL    BUN 13 6 - 20 mg/dL    Creatinine 0.8 0.5 - 1.4 mg/dL    Calcium 10.0 8.7 - 10.5 mg/dL    Total Protein 8.4 6.0 - 8.4 g/dL    Albumin 4.1 3.5 - 5.2 g/dL    Total Bilirubin 0.4 0.1 - 1.0 mg/dL    Alkaline Phosphatase 76 40 - 150 U/L    AST 32 10 - 40 U/L    ALT 36 10 - 44 U/L    eGFR >60 >60 mL/min/1.73 m^2    Anion Gap 13 8 - 16 mmol/L   Ethanol    Collection Time: 02/03/25  4:11 PM   Result Value Ref Range    Alcohol, Serum <10 <10 mg/dL   Acetaminophen level    Collection Time: 02/03/25  4:11 PM   Result Value Ref Range    Acetaminophen (Tylenol), Serum <3.0 (L) 10.0 - 20.0 ug/mL   COVID-19 Rapid Screening    Collection Time: 02/03/25  5:36 PM   Result Value Ref Range    SARS-CoV-2 RNA, Amplification,  Qual Negative Negative         Imaging Results:  Imaging Results    None               The Emergency Provider reviewed the vital signs and test results, which are outlined above.     ED Discussion       3:05 PM: The PEC hold has been issued by Dr. Rivera at this time for danger to self.    4:00 PM: Dr. Rivera transfers care of patient to Dr. Hall pending lab results.    5:00 PM: Pt has been medically cleared by Dr. Hall at this time. Reassessed pt at this time. Pt is resting comfortably and appears in no acute distress. There are no psychiatric services offered at this facility. D/w pt all pertinent ED information and plan to transfer to psychiatric facility for psychiatric treatment. Pt verbalizes understanding. [facility] being [facility] by [facility] for ongoing personal protection en route. Pt has been made aware of all risks and benefits associated with transfer, including but not limited to death, MVC, loss of vital signs, and/or permanent disability. Benefits include ability to be treated at an inpatient psychiatric facility. Pt will be transported by personnel trained in CPR and CPI. Patient understands that there could be unforeseen motor vehicle accidents, inclement weather, or loss of vital signs that could result in potential death or permanent disability. All questions and complaints have been addressed at this time. Pt condition is stable at this time and is clear to transfer to psychiatric facility at this time.     ED Course as of 02/04/25 0241   Mon Feb 03, 2025 1903 I spoke with Dr. Torrez. Needs admission (Psych).  [BA]      ED Course User Index  [BA] Nawaf Hall MD     Medical Decision Making  DDx includes SI, Depression, Medication side effect    Amount and/or Complexity of Data Reviewed  Labs: ordered. Decision-making details documented in ED Course.                ED Medication(s):  Medications - No data to display    There are no discharge medications for this patient.              Scribe  Attestation:   Scribe #1: I performed the above scribed service and the documentation accurately describes the services I performed. I attest to the accuracy of the note.     Attending:   Physician Attestation Statement for Scribe #1: I, Nilay Rivera MD, personally performed the services described in this documentation, as scribed by Leesa White, in my presence, and it is both accurate and complete.       Scribe Attestation:   Scribe #2: I performed the above scribed service and the documentation accurately describes the services I performed. I attest to the accuracy of the note.    Attending Attestation:           Physician Attestation for Scribe:    Physician Attestation Statement for Scribe #2: I, Nawaf Hall MD, reviewed documentation, as scribed by Ana Paula Garcia in my presence, and it is both accurate and complete. I also acknowledge and confirm the content of the note done by Scribe #1.           Clinical Impression       ICD-10-CM ICD-9-CM   1. Suicidal ideations  R45.851 V62.84       Disposition:   Disposition: Transferred  Condition: Stable         Nawaf Hall MD  02/03/25 1731       Nawaf Hall MD  02/04/25 0241

## 2025-02-04 NOTE — CONSULTS
"  [facility]   DEPARTMENT OF PSYCHIATRY     IDENTIFIERS & DEMOGRAPHICS:     SERVICE: Telepsychiatry  ENCOUNTER: initial    TELEPSYCHIATRY (AUDIOVISUAL): Each patient who is provided psychiatric services via telehealth is: (1) informed of the relationship between the psychiatric provider and patient, as well as the respective role of any other health care staff/providers with respect to management of the patient; and (2) notified that he or she may decline to receive psychiatric services by telehealth and may withdraw from such care at any time.  Risks of telehealth include the potential for security breaches (HIPPA compliant platforms notwithstanding) and technological failure, as well as the limitations to physical examination inherent to the modality. The patient was agreeable to the use of telehealth services.    START TIME: 2/3/2025 6:41 PM  STOP TIME: 2/3/2025 7:10 PM    -- PATIENT IDENTIFIERS: Matthew Preston  [id number]  1965  59 y.o.  male  -- REQUESTING PROVIDER: Nawaf Hall MD *  -- LOCATION OF PATIENT: ED    -- PRESENT WITH PATIENT DURING SESSION: ALONE  -- SOURCES OF INFORMATION: PATIENT, EHR/chart, provider(s)  -- LOCATION OF ENCOUNTER PROVIDER: [place], [place]    -- ENCOUNTER PROVIDER: Garry Zaragoza MD        PRESENTATION:   History of Present Illness   **  OVERVIEW OF THE HPI:    60yo male, history of schizophrenia and alcohol use disorder, presents reporting depression and suicidal ideation in context of recent relapse to alcohol and stopping psychotropic regimen.    SUBJECTIVE/CURRENT FINDINGS:    Per Patient:  - states he is depressed and suicidal  - going on a "couple days"   - stopped medication - because he went back to drinking  - was on celexa, seroquel, depakote  - in terms of suicidal ideation, no specific plan  - states if not here, knows he would harm him self - "it's easy to get a gun"  - no homicidal ideation     Per Chart Review:    History of Present Illness: " "Matthew Preston is a 59 y.o. male patient with a PMHx of schizophrenia and HTN who presents to the Emergency Department for evaluation of SI with no plan which onset gradually PTA. Pt lives in a group home; he states that he has not been taking his medications despite being given them by his group home. He states he has not been picking up his medications from the pharmacy either because he has started drinking again. He called for EMS to bring him here, so that he can get help. Symptoms are constant and moderate in severity. No mitigating or exacerbating factors reported. No associated sxs. Pt's L eye appears blind, when asked if this is acute he states that it isn't and that he can see out of it. Dr. Rivera holds up four fingers about a foot from his face covering his right eye and asked how many fingers she was holding up, pt was unable to answer correctly. He restates that he can see from his L eye, "he can see light." Patient denies any fever, AH/VH, agitation, self harm, and all other sxs at this time. No prior tx. No further complaints or concerns at this time.    REVIEW OF SYSTEMS:    >> SOURCES: patient     Y   Sleep Disturbance/Disruption  +insomnia     Y   Appetite/Weight Change  +decreased appetite     Y   Alterations in Energy Level  +fatigue/anergia     Y   Impaired Focus/Concentration  +easy distractibility, +inattentive     Y   Depressive Symptomatology  +depressed mood, +hopelessness, +worthlessness     Y   Excessive Anxiety/Worry   Y   Dysregulated Mood/Behavior  +irritability     N   Manic Symptomatology   N   Psychosis  no hallucinations, no paranoid ideation      Regarding the current presentation, no other significant issues or complaints are voiced or known at this time.      ADD-ON PSYCHOTHERAPY:     N/A         HISTORY:   Patient Information   **  >> SOURCES: patient       PSYCH  SUBSTANCE  FAMILY  SOCIAL  MEDICAL     Y   " Previous/Pre-Existing Psychiatric Diagnoses  Schizophrenia   Y   Past Psychotropic Trials  seroquel, haldol, seroquel, depakote   N   Current Psychiatric Provider  sees primary care   N   Hx of Outpatient Psychiatric Treatment   Y   Hx of Psychiatric Hospitalization   Y   Hx of Suicidal Ideation/Threats   N   Hx of Suicide Attempts/Gestures   N   Hx of Homicidal Ideation/Threats   N   Hx of Homicidal Behavior   N   Hx of Non-Suicidal Self-Injurious Behavior   N   Hx of Perpetrated Violence   Y   Documented Hx of Malingering     Y   Hx of Formal SUZETTE Treatment   Y   Hx of Detox   Y   Hx of Rehab   Y   Recent Alcohol Consumption  +SASQ  recently relapsed about a month ago - drinking 1/5 vodka and a six pack beer daily   +   Drinking Pattern (amount)  +heavy drinking, +physiologically dependent   Y   Hx of Nicotine Use   Y   Hx of Alcohol Misuse/Abuse   Y   Hx of Illicit Drug Misuse/Abuse  former   N   Hx of Prescription Drug Misuse/Abuse   +   Drug Experimentation/Usage  +cocaine       Y   Family Psychiatric History   +   Family Members (re: Psych Hx)  +brother(s)     +   Family Hx of Suicide  no      N   Hx of Trauma   N   Hx of Abuse     Y   Developmental Delay/Disability   N   GED/High School Diploma   N   Post-Secondary Education   N   Currently Employed   Y   Currently on Disability   Y   Financially Stable   Y   Functions Independently   Y   Domiciled  living in group home - this one x3-4y - states likes it and can go back - states they know he's here   N   Lives Alone   Y   Intact Support System  +care team   N   Currently in a Relationship   N   Ever    N   Ever /   Y   Children/Dependents  6   N   Restorationist/Spiritual   N   Hx of  Service     N   Ever Charged/Convicted   N   Current  "Probation/[place]/Diversion   N   Hx of Incarceration     N   Hx of Seizures   N   Hx of Head Trauma     Y   Medical Hx & Diagnoses  GERD   N   Allergies   +   Key Diagnoses  +HTN      >> EHR (EPIC): reviewed/reconciled      The patient's past medical history has been reviewed and updated as appropriate within the electronic health record system.  See PROBLEM LIST & HISTORY for details.    Scheduled and PRN Medications: The electronic chart was reviewed and updated as appropriate.  See MEDCARD for details.    Allergies:  Patient has no known allergies.      EXAMINATION:   Objective   **  VITALS:  BP (!) 158/68   Pulse 78   Temp 98.4 °F (36.9 °C) (Oral)   Resp 16   Ht 5' 10" (1.778 m)   SpO2 97%     MENTAL STATUS EXAMINATION:  Appearance: appropriately dressed, in no apparent distress    Behavior & Attitude: calm, agreeable, cooperative    Movements & Motor Activity: no psychomotor agitation, no psychomotor retardation    Speech & Language: normal rate, normal volume, decreased quantity  non-spontaneous    answers minimally, but appropriately  Mood: depressed  Affect: dysthymic, constricted    Thought Process & Associations: linear, ruminative    Thought Content & Perceptions: no paranoid ideation, no hallucinations    Sensorium: awake, alert, clear    Orientation: grossly intact    Recent & Remote Memory: intact (recent), intact (remote)    Attention & Concentration: intact  not easily distracted    Fund of Knowledge: impaired    Insight: impaired    Judgment: impaired        RISK & REGULATORY:   Impression   **   RISK PARAMETERS:     Y   Suicidal Ideation/Threats   N   Suicide Attempts/Gestures   +   [place] (C-SSRS)  Suicide Risk: High Risk  clinical impression  in AGREEMENT   N   Homicidal Ideation/Threats   N   Homicidal Behavior   N   Non-Suicidal Self-Injurious Behavior   N   Perpetrated Violence      LEGAL (current status  " certification criteria):     - DANGER TO SELF: yes   - DANGER TO OTHERS: no   - GRAVELY DISABLED: no    NOTE: RISK PARAMETERS are current to the encounter/episode  NOT inclusive of past history.       FIREARMS & WEAPONS:     N   Ready Access to Firearms   Y   Prohibition of Firearms Indicated   Y   Gun Safety Counseled  e.g., proper storage, inherent risk     SAFETY SCREENINGS:    -- SERIOUS MENTAL ILLNESS (SMI): PRESENT    -- RISK FACTORS: IDENTIFIED     - SPECIFIC MODIFIABLE FACTORS IDENTIFIED: psychiatric sx, substance abuse, hopelessness/despair, guilt/worthlessness     - SPECIFIC NON-MODIFIABLE FACTORS IDENTIFIED: hx psych tx, male sex, single    -- CONTRACTS FOR SAFETY: NO  -- FUTURE ORIENTED: NO    -- CAREGIVER(S)     - SUPPORTIVE & APPROPRIATELY INVOLVED: YES    -- RISK MITIGATION & PREVENTION:      - INTERVENTIONS: 988/911/ED (info), advice/counseling, harm reduction, scales/measures, safety plan, standardization, tx of pathology     REGULATORY:    -- CARE COORDINATION (spoke with Dr. Hall)  the case was discussed and care was coordinated with member(s) of the treatment team.      INFORMED CONSENT & SHARED DECISION MAKING are the hallmark and bedrock of good clinical care, and as such have been employed and obtained, respectively, to the degree possible.  Discussed, to the extent possible, diagnosis, risks and benefits of proposed treatment (e.g., medication, therapy) vs alternative treatments vs no treatment, potential side effects of these treatments, and the inherent unpredictability of treatment.  Resources have been provided via the patient instructions in the AVS to supplement, augment, and reinforce discussions, counseling, and/or interventions.       - ABILITY TO UNDERSTAND, PARTICIPATE & ENGAGE: adequate     - AGREEABLE TO TREATMENT (consent/assent): the patient consents to treatment     - RELIABILITY/ACCURACY: the patient is deemed to be a vague historian      WARNINGS &  PRECAUTIONS:  >> In cases of emergencies (e.g. SI/HI resulting in danger to self or others, functioning deteriorating to the level of grave disability), call 911 or [phone], or present to the emergency department for immediate assistance.    >> Individuals should not operate a motor vehicle or heavy machinery if effects of medications or underlying symptoms/condition impair the ability to do so safely.    >> FULLY comply with ANY/ALL medication as prescribed/instructed and report ANY/ALL suspected adverse effects to appropriate health care providers.      ASSESSMENT & PLAN:   Assessment & Plan   **  DIAGNOSES & PROBLEMS:    Schizophrenia  Alcohol Use Disorder  Suicidal ideation     PSYCHOTROPIC REGIMEN:  []Continue  []Adjust  []Initiate  [x]Defer  []D/C  []N/A    None currently - noncompliant     A&P (Synthesis  Analysis  Summation  Dispo  Goals  Recs & Mgmt):    Patient presents reporting depression and suicidal ideation.  No obvious secondary gain noted.  Multiple prior inpt psych admits, living in group home - chronic, brittle disease with marginal functioning.  Will need to monitor for detox while in house.  Would benefit from addiction rehab services status post acute psychiatric stabilization.     - CURRENT CONDITION: exacerbated  as compared to typical baseline  - WITH REASONABLE MEDICAL CERTAINTY, based on history, chart review, available collateral information, and a present-state examination:   -- currently meets criteria for psychiatric hospitalization - once medically cleared, seek admission    * PEC is INDICATED - enact if not yet in place, and ensure safety measures and elopement precautions, per unit protocol     >> attended to therapeutic alliance, via the building and maintenance of rapport and trust  >> risk mitigation strategies and safety netting were employed, explored, and reinforced  >> psychotherapy was provided during the session  >> advised to abstain from alcohol and illicit drug use  >>  counseled on full abstinence from alcohol and substances of abuse (illicit and prescription), as well as maintenance of a recovery lifestyle  >> harm reduction techniques discussed, as warranted, to mitigate risk from problematic behaviors  >> serial laboratory testing (e.g. PETH, serum ethanol, urine toxicology) recommended and/or planned to provide accountability, as well as guide and refine treatment moving forward  >> importance of lifelong, active engagement in 12 step (or equivalent) mutual self-help program(s) was stressed/reinforced, including regular meeting attendance and acquisition/maintenance of a sponsor  >> education and/or resources discussed and/or provided for various addiction recovery and rehabilitation options  >> motivational interviewing applied, relapse prevention provided  >> successfully complete a licensed accredited addiction rehab program and follow all aftercare recommendations  >> initiate alcohol (or sedative-hypnotic) withdrawal protocol    ALCOHOL (OR SEDATIVE-HYPNOTIC) WITHDRAWAL PROTOCOL    >> recommendations provided herein should be seen as rough guidelines, as an alcohol/benzodiazepine withdrawal protocol should be individualized and modified routinely, as clinically warranted  >> the absence of a positive serum or urine toxicology for alcohol does not exclude recent heavy prolonged drinking, as there may have been a delay in the patient presenting; a withdrawal protocol may still be warranted, based on the clinical situation  >> in the absence of specified contraindications, err on the side of giving more than less benzodiazepine; diazepam is favored for its long half-life, while lorazepam is typically indicated when hepatic metabolism is of utmost concern  >> provide supportive care (fluid and electrolyte replacement; caloric support; vitamin supplementation/repletion)  >> parenteral thiamine is preferred (at least initially), and should be given prior to glucose, to  prevent/treat Wernicke-Korsakoff Syndrome  >> vital signs should be frequently assessed and factored into management protocols, taking into account comorbidities and baseline cardiovascular status  >> prototypical threshold levels that indicate significant alcohol/benzodiazepine withdrawal and the need for additional doses of benzodiazepines include systolic BP>160, diastolic BP >100 or HR >110  >> frequent assessment with CIWA-Ar is the standard of care and the hallmark of excellent clinical practice  >> a score of 8+ on CIWA-Ar typically warrants administration of benzodiazepines  >> frequent need for administration of a benzodiazepine or CIWA-Ar scores >15 should trigger possible reclassification of the patient as at higher risk for complicated withdrawal, with concomitant adjustment of the detox protocol  >> if withdrawal does not progress over the first 24-48 hours, CIWA-Ar can be administered less frequently - an interval of four to six hours is reasonable   >> patients with a history of seizures, delirium tremens (or withdrawal delirium), or prolonged heavy alcohol (or sedative-hypnotic) consumption, who are minimally symptomatic or asymptomatic and are admitted to the hospital for other reasons, should typically be prophylactically treated with STANDING benzodiazepines  >> if a fixed-dose regimen is utilized, patients should still be reassessed frequently, and additional doses of medication given if a score of 8+ is achieved on the CIWA-Ar  >> if such elevated CIWA-Ar scores are reached routinely, prophylaxis is not adequate, and the regimen should be adjusted accordingly  >> if delirium ensues, rule out and treat potential alternative etiologies, even if delirium tremens is likely   >> identify and correct metabolic derangements and volume deficits, and determine if ICU monitoring is warranted  >> in complicated withdrawal, benzodiazepines should be given via IV route whenever possible to assure proper  absorption  >> titrate benzodiazepines to effect (ideally light sedation) which may entail the use of massive doses of medication  >> if the patient is incapacitated and the CIWA-Ar cannot be used, instead utilize the Mahmood Agitation-Sedation Scale (RASS) with a goal of 0 to -2  >> in refractory delirium tremens (or withdrawal delirium), phenobarbital or propofol can be used; these agents are preferred to dexmedetomidine as they act directly on the PETER and NMDA receptors, which underlie the derangements seen in alcohol (or sedative-hypnotic) withdrawal  >> intubation may be required in refractory DTs, especially with the use of propofol  >> post-withdrawal treatment is paramount; medically supervised withdrawal manages the patient through the withdrawal symptoms but does not treat alcohol (or sedative-hypnotic) use disorders; patients completing withdrawal are at high risk of relapse to resumed alcohol (or sedative-hypnotic) consumption/use  >> if in a facility, patients should be given explicit plans for follow-up care PRIOR to discharge  >> follow-up may involve ongoing treatment through primary care or a specialized addiction treatment program or physician, and is dependent on a number of factors, including availability and patient willingness  >> continuing care, including pharmacotherapy (e.g., MAT) and psychosocial intervention for alcohol (or sedative-hypnotic) use disorders, are indicated      CHART REVIEW: available documentation has been reviewed, and pertinent elements of the chart have been incorporated into this evaluation where appropriate.       KEY & LINKS:        Y  = yes/endorses     N  = no/denies     U  = unknown/unable to assess    ADHD   AIMS   AUDIT   AUDIT-C   C-SSRS (Screen)   C-SSRS (Short)   C-SSRS (Full)   DAST   DAST-10   KELI-7   MoCA   PCL-5   PHQ-9   SUZETTE   YMRS       DIAGNOSTIC TESTING:   Results   **   Glu 73  2/3/2025  Li *   *  TSH *   *    HgA1c *   *  VPA *    *   FT4 *   *    Na 141  2/3/2025  CLZ *   *  WBC 9.03  2/3/2025    Cr 0.8  2/3/2025  ANC 5.9; 65.0;   2/3/2025   Hgb 14.8  2/3/2025     BUN 13  2/3/2025  Trop I *   *  HCT 44.5  2/3/2025     GFR >60  2/3/2025   CPK *   *    2/3/2025     Alb 4.1  2/3/2025   PRL *   *  B12 *   *     T Bili 0.4  2/3/2025  Chol *   *  B9 *   *    ALP 76  2/3/2025  TGs *   *  B1 *   *    AST 32  2/3/2025  HDL *   *  Vit D *   *     ALT 36  2/3/2025  LDL *   *  HIV *   *     INR *   *  Felicity *   *   Hep C *   *    GGT *   *  Lip *   *  RPR *   *    MCV 94  2/3/2025   NH4 *   *  UPT *   *      PETH *   *  THC Negative  2/3/2025    ETOH <10  2/3/2025  RAGHAVENDRA Negative  2/3/2025    EtG *   *  AMP Negative  2/3/2025    ALC *   *  OPI Negative  2/3/2025    BZO Presumptive Positive (A)  2/3/2025  MTD Negative  2/3/2025     BAR Negative  2/3/2025  BUP *   *    PCP Negative  2/3/2025  FEN *   *     No results found for this or any previous visit.      Inpatient consult to Telemedicine - Psych  Consult performed by: Garry Zaragoza MD  Consult ordered by: Nilay Rivera MD        [facility] EMERGENCY DEPARTMENT

## 2025-02-04 NOTE — DISCHARGE INSTRUCTIONS
Thank you for allowing me to participate as part of your health care team, and thank you for choosing [facility].    VIRGEN CRAFT MD  Board Certified in Psychiatry & Addiction Medicine      IN CASE OF SUICIDAL THINKING, call the National Suicide Hotline Number: 988    988 Suicide & Crisis Lifeline: 988 , 2-248-524-TALK (5357)  https://988[place] Direct Auto.org           RECOMMENDATIONS & INSTRUCTIONS:     [x] Take all medication, from all providers, as prescribed.  [x] Follow with primary care provider for routine health maintenance and management of medical co-morbidities, as well as any indicated/needed specialists.  [x] If questions or concerns arise, or if experiencing side effects, adverse reactions or worsening symptoms, contact your provider through the [facility] portal at [email or web address], or call [phone] to reach the [facility] main line.  [x] In cases of emergencies, call [phone], or present directly to the emergency department for immediate assistance.  [x] Avoid alcohol intake; findings now indicate that when it comes to alcohol consumption, there is no safe amount that does not affect health.  [x] Abstain from illicit drug use.  [x] Upon discharge from an emergency department or inpatient setting, it is recommended to follow up with an outpatient provider within 1-2 weeks of discharge, but ideally as soon as possible; additionally, if you currently follow with an outpatient provider, expeditiously contact them upon discharge to apprise them of the situation and receive further instructions.    ADDITIONAL RECOMMENDATIONS:  - successfully complete a licensed addiction rehabilitation program, and follow all aftercare instructions and recommendations  - routinely attend 12 step (or equivalent) mutual self-help meetings  - work with a sponsor  - establish sobriety and maintain a recovery lifestyle    INFORMATION ON MENTAL HEALTH MEDICATIONS:     National [place] of Mental Health:    https://www.nim.nih.gov/health/topics/mental-health-medications     Web MD:   https://www.webmd.[facility]       RESOURCES:     IN CASE OF SUICIDAL THINKING, call the National Suicide Hotline Number: 988    988 Suicide & Crisis Lifeline: 988 , 4-191-168-TALK (8255)  Provides 24/7, free and confidential support for people in distress, prevention and crisis resources for you or your loved ones, and best practices for professionals.    Call, text or chat.  https://988NutshellMailline.org     National Action [place] for Suicide Prevention: the National Action [place] for Suicide Prevention (Action Nottingham) is the nations public-private partnership for suicide prevention, working with more than 250 national partners.   https://theMenara Networksiance.org     National Strategy for Suicide Prevention & Risk Mitigation:  https://theactionalliance.org/our-strategy/national-strategy-suicide-prevention     [x] Fact Sheet:   https://www.[facility].gov/sites/default/files/national-strategy-for-suicide-prevention-factsheet.pdf     [x] Report:   https://www.ncbi.nlm.nih.gov/books/YWL031939/pdf/Bookshelf_NBK109917.pdf     Suicide Prevention Resource Center: The Suicide Prevention Resource Center (SPR) is the only federally supported resource center devoted to advancing the implementation of the National Strategy for Suicide Prevention. [facility] is funded by the U.S. Department of Health and Human Services' Substance Abuse and Mental Health Services Administration (SAMHSA).  https://www.[facility].org     [x] Safety Plan:   https://[employer].[facility]/wp-content/uploads/2021/08/Viraj-Jayme-Safety-Plan-8-6-21.pdf     [x] Suicide Risk Curve:  https://[employer].[facility]/wp-content/uploads/2021/08/Fkupvcz-gtqk-cdjhu-8-6-21.pdf     [place] Mental Health Advocacy Service: the state agency tasked with protecting the legal rights of people with behavioral health diagnoses.  https://mhas.[place].gov     Alcoholics Anonymous (AA): find a meeting near  you.  https://www.aa.org     SMI Adviser: resources for individuals and families with serious mental illness.  https://smiadviser.org     National [place] for the Mentally Ill (BRYCE): the nation's largest grassroots organization dedicated to building better lives for individuals with mental illness.  https://www.bryce.org/Home     U.S. Department of Health and Human Services (HHS): the mission of HHS is to enhance the health and well-being of all Americans, by providing for effective health and human services and by fostering sound, sustained advances in the sciences underlying medicine, public health, and .   https://www.[facility].gov     Substance Abuse and Mental Health Services Administration (SAMHSA): [facility] is the agency within [facility] that leads public health efforts to advance the behavioral health of the nation. [facility]'s mission is to reduce the impact of substance abuse and mental illness on [place]'s communities.   https://www.[facility].gov     National Institutes of Health (NIH): a part of VA hospital, [facility] is the largest biomedical research agency in the world.   https://www.nih.gov     National [place] on Drug Abuse (JANIS): sponsored by the NIH, the mission of JANIS is to advance science on drug use and addiction and to apply that knowledge to improve individual and public health.  https://janis.nih.gov     National [place] on Alcohol Abuse and Alcoholism (NIAAA): sponsored by the NIH, the mission of NIAA is to generate and disseminate fundamental knowledge about the effects of alcohol on health and well-being, and apply that knowledge to improve diagnosis, prevention, and treatment of alcohol-related problems, including alcohol use disorder, across the lifespan.   https://www.niaaa.nih.gov     National Harm Reduction Coalition: resources for harm reduction, including techniques, strategies, policy, and advocacy.  https://harmreduction.org     The SHARE Approach - A Model for Shared Decision Making:  [x]  Fact Sheet  https://www.ahrq.gov/sites/default/files/publications/files/share-approach_factsheet.pdf     AMA Principles of Medical Ethics - Informed Consent & Shared Decision Making:  [x] Chapter  https://www.ama-assn.org/system/files/2019-06/code-of-medical-hcynjh-pqjtkss-6.pdf     Safety Netting for Primary Care:  [x] Article  https://www.ncbi.nlm.nih.gov/pmc/articles/AZX8067491/pdf/zbzgnuf-1168--e70.pdf       MEDICATION MANAGEMENT:     [x] In addition to the potential beneficial effects, the use of any medication or drug (prescribed, over the counter or otherwise) carries with it the risk of potential adverse effects.  Each has a set of typical adverse effects - some common, some rare - but idiosyncratic and unanticipated reactions unique to you are always possible.      [x] It is important to remember that untreated illness can also pose a risk, which must be taken into account when weighing the pros and cons of a medication trial.    [x] Medications and drugs can sometimes interact with each other in the body, leading to adverse effects - it is important that all your providers know all the medications and drugs you take - prescribed, over the counter, or otherwise.  Keep all your practitioners up to date with any changes.  It's always a good idea to keep an up-to-date list in an easily accessible location.    [x] There is an inherent unpredictability to all treatment, including the use of medication.  Unexpected outcomes can occur - keep me up to date with any difficulties you encounter.    [x] It is important to take medication as directed, and to comply fully with the instructions.  Check with the appropriate provider first before adjusting or stopping your medication on your own.    If you require further information pertaining to the issues outlined above, please reach out to your providers through the [facility] portal at [email or web address], or call [phone] to discuss.  See resource list for  additional material.     Additional information can be provided pertaining to your diagnosis, intended outcomes, target symptoms for treatment, and possible benefits and risks of medication - you can also access this information through the provided resources.  Possible alternatives to the current treatment plan (including no treatment) can also be reviewed.      GENERAL HEALTH & WELLNESS:     [x] Establish and follow regularly with a primary care physician for routine health maintenance and management of any medical comorbidities.  [x] Follow a healthy diet, exercise routinely, and monitor weight and metabolic parameters.  [x] Allow adequate time for sleep and practice good sleep hygiene.  [x] Do not operate a motor vehicle or heavy machinery if the effects of medications or the symptoms underlying your condition impair the ability for you to do so safely.    Dietary Guidelines for Americans, 6762-4942:  U.S. Department of Agriculture (USDA)  https://www.dietaryguidelines.gov/sites/default/files/2020-12/Dietary_Guidelines_for_Americans_2020-2025.pdf#page=31     The Nutrition Source:  [employer]  https://www.[facility].[place].[facility]/nutritionsource       SLEEP HYGIENE:     Follow these tips to establish healthy sleep habits:  [x] Keep a consistent sleep schedule. Get up at the same time every day, even on weekends or during vacations.  [x] Set a bedtime that is early enough for you to get at least 7-8 hours of sleep.  [x] Don't go to bed unless you are sleepy.  [x] If you don't fall asleep after 20 minutes, get out of bed. Go do a quiet activity without a lot of light exposure. It is especially important to not get on electronics.  [x] Establish a relaxing bedtime routine.  [x] Use your bed only for sleep and sex.  [x] Make your bedroom quiet and relaxing. Keep the room at a comfortable, cool temperature.  [x] Limit exposure to bright light in the evenings.  [x] Turn off electronic devices at least 30  minutes before bedtime.  [x] Don't eat a large meal before bedtime. If you are hungry at night, eat a light, healthy snack.  [x] Exercise regularly and maintain a healthy diet.  [x] Avoid consuming caffeine in the afternoon or evening.  [x] Avoid consuming alcohol before bedtime.  [x] Reduce your fluid intake before bedtime.    QUICK TIPS FOR BETTER SLEEP  Reduce smartphone usage Create and maintain a nightly ritual Avoid caffeine 4-6 hours before sleeping Don't eat or drink too much at bedtime Sleep at the same time every night        American Academy of Sleep Medicine - Healthy Sleep Habits:  https://sleepeducation.org/healthy-sleep/healthy-sleep-habits     American Academy of Sleep Medicine - Bedtime Calculator:  https://sleepeducation.org/healthy-sleep/bedtime-calculator     American Academy of Sleep Medicine - Cognitive Behavioral Therapy for Insomnia (CBT-I):  https://sleepeducation.org/patients/cognitive-behavioral-therapy     American Academy of Sleep Medicine - Insomnia:  https://sleepeducation.org/sleep-disorders/insomnia       ALCOHOL & DRUG USE COUNSELING:     Preventing Excessive Alcohol Use (CDC):  https://www.cdc.gov/alcohol/fact-sheets/moderate-drinking.htm#:~:text=To%20reduce%20the%20risk%20of,days%20when%20alcohol%20is%20consumed.     [x] Alcohol consumption is associated with a variety of short- and long-term health risks, including motor vehicle crashes, violence, sexual risk behaviors, high blood pressure, and various cancers (e.g., breast cancer).  [x] The risk of these harms increases with the amount of alcohol you drink. For some conditions, like some cancers, the risk increases even at very low levels of alcohol consumption (less than 1 drink).  [x] To reduce the risk of alcohol-related harms, the 5204-1484 Dietary Guidelines for Americans recommends that adults of legal drinking age can choose not to drink, or to drink in moderation by limiting intake to 2 drinks or less in a day for men or  1 drink or less in a day for women, on days when alcohol is consumed.  [x] The Guidelines also do not recommend that individuals who do not drink alcohol start drinking for any reason and that if adults of legal drinking age choose to drink alcoholic beverages, drinking less is better for health than drinking more.  [x] The Guidelines note that some people should not drink alcohol at all, such as:  - If they are pregnant or might be pregnant.  - If they are younger than age 21.  - If they have certain medical conditions or are taking certain medications that can interact with alcohol.  - If they are recovering from an alcohol use disorder or if they are unable to control the amount they drink.  [x] The Guidelines also note that not drinking alcohol is the safest option for women who are lactating. Generally, moderate consumption of alcoholic beverages by a woman who is lactating (up to 1 standard drink in a day) is not known to be harmful to the infant, especially if the woman waits at least 2 hours after a single drink before nursing or expressing breast milk. Women considering consuming alcohol during lactation should talk to their healthcare provider.  [x] The Guidelines note, Emerging evidence suggests that even drinking within the recommended limits may increase the overall risk of death from various causes, such as from several types of cancer and some forms of cardiovascular disease. Alcohol has been found to increase risk for cancer, and for some types of cancer, the risk increases even at low levels of alcohol consumption (less than 1 drink in a day).  [x] Although past studies have indicated that moderate alcohol consumption has protective health benefits (e.g., reducing risk of heart disease), recent studies show this not to be true.  [x] Its important to focus on the amount people drink on the days that they drink. Even if women consume an average of 1 drink per day or men consume an average of 2  "drinks per day, binge drinking increases the risk of experiencing alcohol-related harm in the short-term and in the future.    Drinking Levels Defined (NIAAA):  https://www.niaaa.nih.gov/alcohol-health/overview-alcohol-consumption/moderate-binge-drinking     Drinking in Moderation:  According to the "Dietary Guidelines for Americans 3259-5166, U.S. Department of Health and Human Services and U.S. Department of Agriculture, adults of legal drinking age can choose not to drink or to drink in moderation by limiting intake to 2 drinks or less in a day for men and 1 drink or less in a day for women, when alcohol is consumed. Drinking less is better for health than drinking more, and findings now indicate that when it comes to alcohol consumption, there is no safe amount that does not affect health.    Binge Drinking:  NIAAA defines binge drinking as a pattern of drinking alcohol that brings blood alcohol concentration (LEONID) to 0.08 percent - or 0.08 grams of alcohol per deciliter - or higher.  For a typical adult, this pattern corresponds to consuming 5 or more drinks (male), or 4 or more drinks (female), in about 2 hours.    The Substance Abuse and Mental Health Services Administration (SAMHSA), which conducts the annual National Survey on Drug Use and Health (NSDUH), defines binge drinking as 5 or more alcoholic drinks for males or 4 or more alcoholic drinks for females on the same occasion (i.e., at the same time or within a couple of hours of each other) on at least 1 day in the past month.    Heavy Alcohol Use:  NIAAA defines heavy drinking as follows:  - For men, consuming more than 4 drinks on any day or more than 14 drinks per week.  - For women, consuming more than 3 drinks on any day or more than 7 drinks per week.     [facility] defines heavy alcohol use as binge drinking on 5 or more days in the past month.    Patterns of Drinking Associated with Alcohol Use Disorder:  Binge drinking and heavy alcohol use can " increase an individual's risk of alcohol use disorder.    Certain people should avoid alcohol completely, including those who:  - Plan to drive or operate machinery, or participate in activities that require skill, coordination, and alertness.  - Take certain over-the-counter or prescription medications.  - Have certain medical conditions.  - Are recovering from alcohol use disorder or are unable to control the amount that they drink.  - Are younger than age 21.  - Are pregnant or may become pregnant.    U.S. Standard Drink  12 oz beer   (5% ABV) 8 oz malt liquor   (7% ABV) 5 oz wine   (12% ABV) 1.5 oz 80-proof distilled spirit  (40% ABV)        Heroin use harm reduction:  1. Carry naloxone. When using heroin, make sure you have at least one dose of naloxone - the overdose reversal drug - and have it in plain view. Understand how to give it.  2. Try a small dose first. It is best to first try a small amount of the heroin to check the effect.  3. Dont use heroin alone. Always use heroin with someone else and take turns while using.    It is possible to overdose with heroin whether you are snorting, injecting or using it in another form.    Signs of an overdose or emergency:   - The person is awake but unable to talk.  - Their body is limp.  - Their breathing is shallow or slow or stopped.  - Their skin is pale, ashen or clammy/sweaty.  - They are unconscious.    In case of emergency, give naloxone. If you suspect the heroin may contain fentanyl, administer more than one dose. Seek medical help even if naloxone has been given. Call 911 for help.      ADDICTION & RECOVERY:     [x] Addiction is a chronic medical illness, and as such, requires treatment through the lifespan  [x] Individuals with a history of alcohol or drug use disorder should maintain sobriety and a recovery lifestyle, as failure to do so can lead to relapse and progression of illness  [x] As part of a recovery lifestyle, it is advised to routinely  attend mutual self-help groups (12 step or equivalent) and to work with a sponsor  [x] For those with a history of alcohol or drug use disorder, it is important that all members of your treatment team - regardless of specialty - are fully apprised of your history and recovery plan moving forward.    For those struggling with active addiction, [facility] is an intensive outpatient addiction rehabilitation program located at [facility] [facility] - please call [phone] to speak with an  about enrolling in the program.      ADHD TREATMENT AND STIMULANT MEDICATIONS:     [facility] Prescription Stimulants Drug Facts  CMS Stimulant and Related Medications: Use in Adults  [place] Drug Fact Sheets: Stimulants  FDA Drug Safety Communication: Stimulants  [facility] ADHD  WebMD ADHD Medications and Side Effects  [facility]: ADHD Medication      SHARED DECISION MAKING & INFORMED CONSENT:     Shared medical decision making and informed consent are the hallmark and bedrock of excellent clinical care.  During the encounter, shared medical decision making was employed and informed consent was obtained, to the degree possible, whenever feasible, appropriate and relevant. Those interventions are supplemented here with written materials, detailing the topics in more depth.       PSYCHOEDUCATION:     Psychoeducation pertaining to the following -     Diagnosis Etiology Disease Processes Natural Progression   Treatment Options Time Course Safety Netting Informed Consent   Intended Benefits of Medication Expectable Adverse Effects Target Symptoms for Treatment Alternatives to Current Treatment   Shared   Decision Making Risk Mitigation Strategies Harm Reduction Techniques Associated Bio-Med Complications     - can be further discussed and reviewed (you can also access additional information through the provided resources in this document).      Effective communication is essential in order to engage in  shared medical decision making.  If you had difficulty understanding anything during your encounter or in this supplementary document, please contact your providers through the [facility] portal at [email or web address] or call [phone].     [place] Dictionary  https://dictionary.malena.org/us       It can be easy to miss, forget, or misremember important important information that was discussed during the session - especially when you're stressed, upset, or don't feel well.  If you or a representative have any additional questions, concerns, or topics to discuss - please contact your providers through the [facility] portal at [email or web address] or call [phone].    Memory Loss  https://www.EGT.Elliptic/brain/memory-loss    Causes of Memory Loss  https://www.Revelation/what-causes-memory-loss-7234476    Memory loss: When to seek help  https://www.[facility].org/diseases-conditions/alzheimers-disease/in-depth/memory-loss/art-68076392    Memory, Forgetfulness, and Aging: What's Normal and What's Not?  https://www.vilma.nih.gov/health/memory-forgetfulness-and-aging-whats-normal-and-whats-not    Depression and Memory Loss  https://www.[employer]/health/depression/depression-and-memory-loss    The Relationship Between Anxiety and Memory Loss  https://www.[employer].[facility]/academics/blog-posts/the-relationship-between-anxiety-and-memory-loss     PRESCRIPTION DRUG MANAGEMENT:     Prescription Drug Management entails the following:  [x] The review, recommendation, or consideration without recommendation of medications during the encounter.  [x] Discussion (to the extent possible) with the patient and/or other interested parties of the diagnosis, target symptoms, intended outcomes, and possible benefits and risks of medication, as well as alternatives (including no treatment), if not otherwise known or stated prior.  [x] Discussion (to the extent possible) with the patient and/or other interested parties of  possible expectable adverse effects of any proposed individual psychotropic agents, as well as the inherent unpredictability of treatment, if not otherwise known or stated prior.  [x] Informed consent is sought from the patient (and/or guardian/designated decision maker, if applicable) after a thorough discussion (to the extent possible) of the aforementioned points outlined above.  [x] The provision of counseling (to the extent possible) to the patient and/or other interested parties on the importance of full compliance with any prescribed medication, if not otherwise known or stated prior.    Information on psychotropic medication can be found at:   National [place] of Mental Health: Information on Mental Health Medications      RISK MITIGATION, HARM REDUCTION & SAFETY NETTING:     Risk Mitigation Strategies, Harm Reduction Techniques, and Safety Netting are important interventions that can reduce acute and chronic risk.  As such, opportunities were sought to incorporate psychoeducation and practical advice pertaining to these topics into the encounter, to the degree possible, whenever feasible, appropriate and relevant.  Those interventions are supplemented here with written materials, detailing the topics in more depth.       RISK MITIGATION STRATEGIES:     Risk mitigation strategies are used to reduce the likelihood of future episodes of suicide, homicide, violence, and/or other problematic behaviors (e.g. self-injurious, risky, addictive, compulsive, impulsive). The following are examples of risk mitigation strategies which you can employ in order to reduce your overall burden of risk.     [x] Treatment of underlying psychopathology driving acute and chronic risk to the extent possible.  [x] Use of self administered rating scales and journaling to assist in risk tracking.  [x] Exploration of protective factors to potentially counterbalance risk.  [x] Identification and avoidance of triggers and situations  that increase risk, including excessive alcohol and drug use.  [x] Timely follow up and ongoing treatment of mental health issues moving forward.  [x] Full compliance with medication regimen.  [x] A good working knowledge of your medication regimen, including specific instructions on the administration of the medications.  [x] Consultation with an appropriate medical provider prior to altering or deviating from these instructions on your own.  [x] Active involvement and participation of family and natural support wherever feasible and possible.  [x] Development and review of coping strategies that can be immediately deployed in times of acute crisis.  [x] Implementation of home safety practices and the removal/reduction of access to lethal means (including, but not limited to, firearms, certain types and quantities of medication, poisons, or other methods you may have contemplated or identified).  [x] Collaborative development of a written safety plan with your treatment team and loved ones that can be immediately referred to in times of acute crisis.  [x] Utilization of a safety contract to engage your treatment team and further assess/manage risk.  [x] A good working knowledge of how to access emergency treatment in times of acute crisis.  [x] Utilization of suicide hotlines number (988) and resources in times of crisis.    If you require further information pertaining to the issues outlined above, please reach out to your providers through the [facility] portal at [email or web address], or call [phone] to discuss.  See resource list for additional material.      SAFETY NETTING:     In healthcare, safety netting refers to the provision of information to help patients or carers identify the need to consult a health care professional if a health concern arises or changes.  The relevance of this advice is most obvious with chronic mental illnesses, as their dynamic nature, with symptoms and signs emerging at  different times and in different combinations, makes safety netting particularly important.  Specific safety net advice for you includes the following:    [x] The existence of uncertainty. Mental health diagnoses and conditions contain at least some degree of uncertainty - knowing this, you should feel empowered to reconsult if necessary.  [x] What exactly to look out for. Given the recognised risk of possible deterioration or the development of complications, you should become familiar with the specific clinical features (including red flags) to look out for.    [x] How exactly to seek further help. You should know how and where to seek further help if needed.  Make a plan in advance and keep it handy.  It's also a good idea to share the plan with your treatment providers and loved ones.  [x] What to expect about time course. Mental health diagnoses and conditions often have an expected time course, which is important information for you to know.  However, if your difficulties do not conform to this time line and concerns arise, do not delay seeking further medical advice.    If you require further information pertaining to the issues outlined above, please reach out to your providers through the [facility] portal at [email or web address], or call [phone] to discuss.  See resource list for additional material.      HARM REDUCTION:     Harm Reduction techniques are used in an effort to reduce negative consequences associated with risky and maladaptive behaviors, until cessation of the problematic behaviors can be established.  Harm reduction is best thought of as a journey and not a destination; it is not an endorsement of problematic behavior, but an acknowledgement and recognition of the step-by-step nature of recovery.      Although commonly employed in working with people who suffer with drug addiction, harm reduction can be more broadly applied to any problematic behavior.    Harm Reduction and Substance  Abuse:  [x] Incorporates a spectrum of strategies that includes safer use, managed use, abstinence, meeting people who use drugs where theyre at, and addressing conditions of use along with the use itself.  [x] Accepts, for better or worse, that licit and illicit drug use is part of our world and chooses to work to minimize its harmful effects rather than simply ignore or condemn them.  [x] Understands drug use as a complex, multi-faceted phenomenon that encompasses a continuum of behaviors from severe use to total abstinence, and acknowledges that some ways of using drugs are clearly safer than others.  [x] Calls for the non-judgmental, non-coercive provision of services and resources to people who use drugs and the communities in which they live in order to assist them in reducing attendant harm.  [x] Affirms people who use drugs themselves as the primary agents of reducing the harms of their drug use and seeks to empower them to share information and support each other in strategies which meet their actual conditions of use.  [x] Does not attempt to minimize or ignore the real and tragic harm and danger that can be associated with illicit drug use.  [x] Meets people where they are, but seeks to not leave them there.  [x] Examples of specific interventions include, but are not limited to, narcan (naloxone), medication assisted treatment, syringe access, overdose prevention, and safer drug use techniques.    Key Harm Reduction Strategies: Opioid Use Disorder  [x] Safe Injection Sites & Equipment  [x] Managed Use  [x] Syringe Exchange Programs  [x] Fentanyl Test Strips  [x] Pharmacotherapy/Medication Assisted Treatment  [x] Narcan  [x] Good Mandaeism Laws  [x] Treatment Instead of custodial  [x] Diversion Programs  [x] Overdose Education  [x] Abstinence    Whether or not you struggle with substance abuse, any and all opportunities to employ harm reduction techniques to address difficult to change problematic  "behaviors should be sought and implemented - whenever and wherever feasible, relevant and applicable. Additionally, harm reduction techniques can be applied broadly, and are relevant for a multitude of situations - even those that do not involve problematic or maladaptive behaviors.     EXAMPLES OF HARM REDUCTION IN OTHER AREAS  SUN SCREEN SEAT BELTS SPEED LIMITS BIRTH CONTROL        If you require further information pertaining to the issues outlined above, please reach out to your providers through the [facility] portal at [email or web address], or call [phone] to discuss.  See resource list for additional material.      FIREARM SAFETY:     THE SIX BASIC GUN SAFETY RULES  There are six basic gun safety rules for gun owners to understand and practice at all times:  Treat all guns as if they are loaded. Always assume that a gun is loaded even if you think it is unloaded. Every time a gun is handled for any reason, check to see that it is unloaded. If you are unable to check a gun to see if it is unloaded, leave it alone and seek help from someone more knowledgeable about guns.  Keep the gun pointed in the safest possible direction. Always be aware of where a gun is pointing. A "safe direction" is one where an accidental discharge of the gun will not cause injury or damage. Only point a gun at an object you intend to shoot. Never point a gun toward yourself or another person.  Keep your finger off the trigger until you are ready to shoot. Always keep your finger off the trigger and outside the trigger guard until you are ready to shoot. Even though it may be comfortable to rest your finger on the trigger, it also is unsafe. If you are moving around with your finger on the trigger and stumble or fall, you could inadvertently pull the trigger. Sudden loud noises or movements can result in an accidental discharge because there is a natural tendency to tighten the muscles when startled. The trigger is for firing and " the handle is for handling.  Know your target, its surroundings and beyond. Check that the areas in front of and behind your target are safe before shooting. Be aware that if the bullet misses or completely passes through the target, it could strike a person or object. Identify the target and make sure it is what you intend to shoot. If you are in doubt, DON'T SHOOT! Never fire at a target that is only a movement, color, sound or unidentifiable shape. Be aware of all the people around you before you shoot.  Know how to properly operate your gun. It is important to become thoroughly familiar with your gun. You should know its mechanical characteristics including how to properly load, unload and clear a malfunction from your gun. Obviously, not all guns are mechanically the same. Never assume that what applies to one make or model is exactly applicable to another. You should direct questions regarding the operation of your gun to your firearms dealer, or contact the  directly.  Store your gun safely and securely to prevent unauthorized use. Guns and ammunition should be stored separately. When the gun is not in your hands, you must still think of safety. Use an approved firearms safety device on the gun, such as a trigger lock or cable lock, so it cannot be fired. Store it unloaded in a locked container, such as an approved lock box or a gun safe. Store your gun in a different location than the ammunition. For maximum safety you should use both a locking device and a storage container.    ADDITIONAL SAFETY POINTS  The six basic safety rules are the foundational rules for gun safety. However, there are additional safety points that must not be overlooked.  [x] Never handle a gun when you are in an emotional state such as anger or depression. Your judgment may be impaired. If you have acute or chronic suicidal ideation, a suicide plan, or suicidal intent, have firearms removed and your access restricted by a  "trusted loved one or other responsible individual or agency.  [x] Never shoot a gun in celebration (the Fourth of July or New Year's Liat, for example). Not only is this unsafe, but it is generally illegal. A bullet fired into the air will return to the ground with enough speed to cause injury or death.  [x] Do not shoot at water, flat or hard surfaces. The bullet can ricochet and hit someone or something other than the target.  [x] Hand your gun to someone only after you verify that it is unloaded and the cylinder or action is open. Take a gun from someone only after you verify that it is unloaded and the cylinder or action is open.  [x] Guns, alcohol and drugs don't mix. Alcohol and drugs can negatively affect judgment as well as physical coordination. Alcohol and any other substance likely to impair normal mental or physical functions should not be used before or while handling guns. Avoid handling and using your gun when you are taking medications that cause drowsiness or include a warning to not operate machinery while taking this drug.   [x] The loud noise from a fired gun can cause hearing damage, and the debris and hot gas that is often emitted can result in eye injury. Always wear ear and eye protection when shooting a gun.      GUNS AND CHILDREN - FIREARM OWNER RESPONSIBILITIES    You Cannot Be Too Careful with Children and Guns  [x] There is no such thing as being too careful with children and guns. Never assume that simply because a toddler may lack finger strength, they can't pull the trigger. A child's thumb has twice the strength of the other fingers. When a toddler's thumb "pushes" against a trigger, invariably the barrel of the gun is pointing directly at the child's face. NEVER leave a firearm lying around the house.  [x] Child safety precautions still apply even if you have no children or if your children have grown to adulthood and left home. A nephew, niece, neighbor's child or a grandchild may " "come to visit. Practice gun safety at all times.  [x] To prevent injury or death caused by improper storage of guns in a home where children are likely to be present, you should store all guns unloaded, lock them with a firearms safety device and store them in a locked container. Ammunition should be stored in a location separate from the gun.    Talking to Children About Guns  [x] Children are naturally curious about things they don't know about or think are "forbidden." When a child asks questions or begins to act out "gun play," you may want to address his or her curiosity by answering the questions as honestly and openly as possible. This will remove the mystery and reduce the natural curiosity. Also, it is important to remember to talk to children in a manner they can relate to and understand. This is very important, especially when teaching children about the difference between "real" and "make-believe." Let children know that, even though they may look the same, real guns are very different than toy guns. A real gun will hurt or kill someone who is shot.    Instill a Mind Set of Safety and Responsibility  [x] The American Academy of Pediatrics reports that adolescence is a highly vulnerable stage in life for teenagers struggling to develop traits of identity, independence and autonomy. Children, of course, are both naturally curious and innocently unaware of many dangers around them. Thus, adolescents as well as children may not be sufficiently safeguarded by cautionary words, however frequent. Contrary actions can completely undermine good advice. A "Do as I say and not as I do" approach to gun safety is both irresponsible and dangerous.  [x] Remember that actions speak louder than words. Children learn most by observing the adults around them. By practicing safe conduct you will also be teaching safe conduct.    Safety and Storage Devices  [x] If you decide to keep a firearm in your home you must consider " the issue of how to store the firearm in a safe and secure manner. There are a variety of safety and storage devices currently available to the public in a wide range of prices. Some devices are locking mechanisms designed to keep the firearm from being loaded or fired, but don't prevent the firearm from being handled or stolen. There are also locking storage containers that hold the firearm out of sight. For maximum safety you should use both a firearm safety device and a locking storage container to store your unloaded firearm.   Two of the most common locking mechanisms are trigger locks and cable locks. Trigger locks are typically two-piece devices that fit around the trigger and trigger guard to prevent access to the trigger. One side has a post that fits into a hole in the other side. They are locked by a key or combination locking mechanism. Cable locks typically work by looping a strong steel cable through the action of the firearm to block the firearm's operation and prevent accidental firing. However, neither trigger locks nor cable locks are designed to prevent access to the firearm.   [x] Smaller lock boxes and larger gun safes are two of the most common types of locking storage containers. One advantage of lock boxes and gun safes is that they are designed to completely prevent unintended handling and removal of a firearm. Lock boxes are generally constructed of sturdy, high-grade metal opened by either a key or combination lock. Gun safes are quite heavy, usually weighing at least 50 pounds. While gun safes are typically the most expensive firearm storage devices, they are generally more reliable and secure.     Remember: Safety and storage devices are only as secure as the precautions you take to protect the key or combination to the lock.    RULES FOR KIDS  Adults should be aware that a child could discover a gun when a parent or another adult is not present. This could happen in the child's own  home; the home of a neighbor, friend or relative; or in a public place such as a school or park. If this should happen, a child should know the following rules and be taught to practice them.   Stop  The first rule for a child to follow if he/she finds or sees a gun is to stop what he/she is doing.  Don't Touch!  The second rule is for a child not to touch a gun he/she finds or sees. A child may think the best thing to do if he/she finds a gun is to pick it up and take it to an adult. A child needs to know he/she should NEVER touch a gun he/she may find or see.  Leave the Area  The third rule is to immediately leave the area. This would include never taking a gun away from another child or trying to stop someone from using gun.  Tell an Adult  The last rule is for a child to tell an adult about the gun he/she has seen. This includes times when other kids are playing with or shooting a gun.     METHODS OF CHILDPROOFING YOUR FIREARM  As a responsible handgun owner, you must recognize the need and be aware of the methods of childproofing your handgun, whether or not you have children.  Whenever children could be around, whether your own, or a friend's, relative's or neighbor's, additional safety steps should be taken when storing firearms and ammunition in your home.  [x] Always store your firearm unloaded.  [x] Use a firearms safety device AND store the firearm in a locked container.  [x] Store the ammunition separately in a locked container.  Always storing your firearm securely is the best method of childproofing your firearm; however, your choice of a storage place can add another element of safety. Carefully choose the storage place in your home especially if children may be around.  [x] Do not store your firearm where it is visible.  [x] Do not store your firearm in a bedside table, under your mattress or pillow, or on a closet shelf.  [x] Do not store your firearm among your valuables (such as jewelry or  cameras) unless it is locked in a secure container.  [x] Consider storing firearms not possessed for self-defense in a safe and secure manner away from the home.    [employer] for Gun Safety:  https://www.everytown.org       Gun Violence: Prediction, Prevention and Policy  American Psychological Association Panel of Experts Report  https://www.apa.org/pubs/reports/gun-violence-report.pdf     If you require further information pertaining to any of the issues outlined above, please reach out to your providers through the [facility] portal at [email or web address], or call [phone] to discuss.  See resource list for additional material.      IN CASE OF SUICIDAL THINKING, call the [employer] Suicide Hotline Number: 988    988 Suicide & Crisis Lifeline: 988 , 3-753-082-TALK (8255)  Provides 24/7, free and confidential support for people in distress, prevention and crisis resources for you or your loved ones, and best practices for professionals.    Call, text or chat.  https://[employer]           REFERRAL RECOMMENDATIONS FOR SUBSTANCE ABUSE & MENTAL HEALTH      IN CASE OF SUICIDAL THINKING, call the [employer] Suicide Hotline Number: 988    988 Suicide & Crisis Lifeline: 988 , 4-703-783-SZMN (8255)  https://[employer]       SUBSTANCE ABUSE:     [facility] (formerly known as the ABU)  [x] [phone], Option 2  [x] [address], [place] [address]  [x] [email or web address]  [x] [facility] delivers comprehensive and collaborative treatment for alcohol and substance use disorders.  Excellent program for working professionals or anyone else seeking recovery.  [x] Requires insurance approval prior to starting program, call number above for more information.  [x] Intensive Outpatient Rehabilitation Program - M-F 9am-3pm - daily groups with psychologists and social workers, sessions with MDs 3x per week   [x] Ambulatory detox  and dual diagnosis available      SUBOXONE:     NOTE: some Suboxone clinics require their clients to participate in a structured program (such as an IOP) in order to be prescribed Suboxone.  Some clinics have a long waiting list.  Most of these clinics do not accept walk-in clients, so call first to to learn what must be done to get started on Suboxone.    [facility] - [phone] (can do Sublocade)  [address], [place] [address]    [facility]  [address], [place]  [phone]    [facility] - [phone] (can do Sublocade)  [address] [place] [address]    [facility]  [address] [place]  [phone]     [facility] (very short waiting list, may accept some walk-in's but call first if possible)  [address], [place] [id number] [phone]; [phone]    [facility]   [address] [place]    [phone]    [facility] (can usually be seen within a week but is cash only for appointment)  [address] [place] [address] [place], [place]    [facility] ([facility])  [address] [place]  [phone]    [facility] ([facility])  [address] [place] [id number] [phone]    [place], [place]:    [facility] - [address] [place] [address] - Tel: [phone]    Angel Sheridan - [address] [address] - Tel: [phone]    Ceferino Valdes - [address] [place] [address] - Tel: [phone]    Jay Jay Aguilar - [address] [place] [address] - Tel: [phone]    Alexander Alan - [address] [place] [address] - Tel: [phone]    [place], [place]:     Dr. Jenelle Tucker and Dr. Anand Lopez - [address], [place] - Tel: [phone]    Dr. Kay Smith - [address] [place] [address] [place] Th[place] - Tel: [phone]    Dr. Shawn Vieyra - Tel: [phone]    Dr. Maynor Rodrigues [facility] - [phone]      METHADONE:     [facility] (the only  methadone clinic in the city, has two locations)  [x] [place] - [address] [place] [address], [phone]  [x] [facility] [place] [address], [phone]      12 STEP PROGRAMS (and similar):     Alcoholics Anonymous (local)  [x] [phone]  [x] www.[place].org for schedules for in-person and online meetings  [x] There are AA meetings throughout the day all over town  [x] AA costs nothing to attend; they pass a basket for donations but this is not required    Narcotics Anonymous  [x] [phone]  [x] www.noana.org  [x] There are NA meetings throughout the day all over [facility]  [x] NA costs nothing to attend; they pass a basket for donations but this is not required    Alcoholics Anonymous Online Intergroup (national)  [x] www.aa-intergroup.org  [x] Good resource for large, nation-wide meetings  [x] Can also attend smaller, local meetings in other cities  [x] Countless meetings all day and all night  [x] AA costs nothing to attend; they pass a basket for donations but this is not required    [employer] - 24/7 zoom meetings for women and coed - sign on anytime, anywhere!  [email or web address]    Online Intergroup of AA - [address] [facility] [place] Meeting - 24/7 zoom meetings  https://aa-intergroup.org/[email or web address]    LOOKING FOR AN ALTERNATIVE TO 12 STEP PROGRAMS - check out:  [facility]: https://www.smartrecovery.org/about-us  [facility]: https://recoverydharma.org      DETOX UNITS (USUALLY 5-7 DAYS):     [facility]: [address], [place]  [phone], call first to ensure bed availability    [facility]: [address] [place]  [phone], Option 1, call first to ensure bed availability    [facility]: [address], [place]  [phone] (intake by appointment only)    [facility]: [address], [place]  [phone]      INTENSIVE OUTPATIENT PROGRAMS:     [facility] (formerly known  as the ABU)  [x] [phone], Option 2  [x] [address], [facility] [address]  [x] [email or web address]  [x] [facility] delivers comprehensive and collaborative treatment for alcohol and substance use disorders.  Excellent program for working professionals or anyone else seeking recovery.  [x] Requires insurance approval prior to starting program, call number above for more information.  [x] Intensive Outpatient Rehabilitation Program - M-F 9am-3pm - daily groups with psychologists and social workers, sessions with MDs 3x per week   [x] Ambulatory detox and dual diagnosis available    [facility] Intensive Outpatient Program  [x] [phone]  [x] [facility] (the clinic not on [facility] main campus)  [x] Call number above for more info and to check insurance requirements    [facility]  [address] [place] [address] [phone]    [facility]:  [address] [facility], [address] [place], [place] [address]?, [phone]  [address] [facility]?, [place], [place] [address]?, [phone]    RESIDENTIAL REHABS (USUALLY 28 DAYS):     [facility]: [address], [phone]    [facility]: [address] [place] [address] , [facility]  [phone] (intake by appointment only)    [facility] (men only) [address], [facility], [phone]    [facility] (Female only) [address] [facility], [phone]    [facility]: [address], [facility], men's program [phone], women's program [phone]    [facility]: [address], [facility], [phone]    [facility]: [address], [place], [place], [phone]    [facility]: Men only, [phone], [address] [place]    [facility]: [address], [place], [place], [phone]    [facility]: [address] [place] [address],  [phone]  New Location: [address] [place] [address]  [address], [place] [address], [phone]    [facility]:   ?[address]?[place], [place] [address]?[phone]    Sean: [address], [place] [address], [phone]    Maxie: [place], [place], [phone]     [facility]: [place], [place], [phone]    [facility]: [place], [place], [phone]    [facility]: [place], [place], [phone]    [place]: [place], [place], [phone]    [facility]: [address] [facility], [place] [address], [phone]    COMMUNITY ADDICTION CLINICS:     [facility]ER: [address] [facility], [facility] B David [phone] -or- [address] [place] [address]    [facility] Recovery [place]: [address] [place]  [address]     [facility]: Clinics [phone]; Crisis [phone]    [facility]: [address] [place] [address]    [facility]: [address] [place] [address]    [facility]: [address] [place] [address],    [facility] Center: 2nd Floor [address] [place] [address]    [facility]: [address], [place] [address]    [facility], [address] [place], [place] [address]    [facility] Center: [address] [place] [address] [phone]  (serves youth 16-23 years old)    [facility] Center: [facility]/[place]/[facility] [phone]    [facility]: [address] [facility], CYNDIE [phone]    [facility]: [address] [facility] [phone]    [facility]: [address] [facility], [address], [phone]     [facility]: [address] [facility] Sheri Cazares, [phone], [phone]    RESOURCES IN OTHER [facility]:     [facility]: [facility]. [address]  [address] [place], [phone], [phone]    [facility]: [address] [facility], [address], [phone]    [facility], [address] [place] [address], [phone]    [facility] Behavioral Health: [address] [facility], [phone]    [facility] Health, [address] [facility], [phone] ([facility])    [facility], [address] [facility], [phone] ([facility])    [facility] Health, [address], [place], [phone] ([facility], and [facility])    [facility], [address] [facility], [phone] ([facility])    [facility] Hotline [phone], [phone]    [facility], [address] [facility], [facility], [address] [facility] [place], [address], [facility] Behavioral [facility], [address] [facility] Behavioral [facility], 500 [facility]. [address]., [facility] Behavioral [facility], [address], [facility] Human [facility], [address], [place] [phone]    [facility] Human [facility], 302 [facility] [phone]    [facility] Recovery, [address] [facility], [phone]    [facility]. for Addiction Recovery, [address] [facility], [phone]      [place] SPEAKING (en español):     Información de la reunión de Alcohólicos Anónimos  [facility], 10:00 am  Habla español  Esta reunión está abierta y cualquiera puede asistir.    Cook Islander speaking Alcoholics anonymous meetings:  El "Seth [place] AA Skype" es un seth on line de Alcohólicos Anónimos en español. El seth es shanna, gratuito y virtual a través de Skype Audio. El seth funciona mediante saniya llamada " grupal de voz, por lo que no se utiliza la videollamada, ni se pueden quinton las imágenes o rostros de los participantes. Hace janina años y medio abrimos el primer Seth de AA por Bobby en [place], sherry actualmente asisten personas desde [place], [place], [place], [place], [place], [place], [place], [place], [place], [place], [place] y [place], entre otros.    El seth es muy útil para los alcohólicos que residen en lugares donde no se celebran reuniones de AA, o residen en lugares donde las reuniones de AA son un número limitado de días a la semana, o para aquellos compañeros que se hayan de viaje o que, por cualquier motivo, se hayan convalecientes y no pueden desplazarse. Todos los días nos reuniones a las 21:00 (hora española)    Podéis obtener más información sobre el seth y maite sesiones en la página web [email or web address]      MENTAL HEALTH:     [facility] Out[facility]  [phone]    [facility] - General Psychiatry Intensive Outpatient Program  [facility] (formerly known as the BMU)  [phone], option 3    [facility] - Dual Diagnosis Intensive Outpatient Program  [facility] (formerly known as the ABU)  [phone], option 2      [facility] MENTAL HEALTH CENTERS:     [facility]  (aka [facility], [facility])  Serves [facility], and [facility] residents.  Serves uninsured patients & those with Medicaid.  Main location: [address] [place] [address]  [phone]  Walk-in's available during regular business hours.  24/7 Crisis Line: [phone]    [facility]  (aka [facility], [facility])  Serves [place] residents.  Serves uninsured patients, those with Medicaid and some private plans.  Walk-in's available during regular business hours.  Primary care services  available as well.  [facility]: [address] [facility] [address] [place] [address];  [phone]  [place]: [address] [place] [address];  [phone]  24/7 Crisis Line: [phone]    [facility]  Serves uninsured patients & those with Medicaid, call for more info.  Primary care, pediatrics, HIV treatment, and dentistry services available as well.  Three locations.  [phone]    [facility] [place]?Corporate Office  Serves patients with Medicaid, Medicare, and private insurance  [address]  [place] [address]  [phone]    [facility]  Serves uninsured on a sliding scale, as well as Medicaid, Medicare, and private plans.  Eight locations around the [facility].  [phone]    [facility]  Serves uninsured patients & those with Medicaid, private insurances.  Primary care available as well.  [phone]  [address] [place] [address]    [facility]  Serves veterans who were honorably discharged.  [address] [place] [address]  [phone]  24/7 Veterans Crisis Line: [phone] (Press 1)    If you have private insurance and need to find a specialist, please contact your insurance network to request a list of providers covered by your benefits.      MENTAL HEALTH/ADDICTIVE DISORDERS:     AA ([phone]), NA ([phone])   National Suicide Prevention Lifeline- Call 1-484.928.5296 Available 24 hours everyday  [facility] [phone]; Crisis Line [phone] - Call for options A-F:  Intensive Outpatient Treatment/ Day programs   [facility], please contact   [facility], please contact [phone] or [phone] to speak with an admissions counselor.  [facility] (Methadone Maintenance)   [address] [place] [address], [phone]  [address] [place] [address] [phone]  [facility], [address] [address], [phone]  [place] [facility] [phone]  [facility] please contact [phone]  [facility], [address], 4th [place] [address] Phone: [phone]   [facility]  [place] Office: [address] [place] [place] [address], [phone]  [place] Office: [address] [facility], [address], [place] [address], [phone]  [place] Office: [address] [place], [place] [address] [phone]    Outpatient Substance Abuse Treatment   [facility] (Methadone Maintenance)   [address] [place] [address], [phone]  [address] [place] [address] [phone]  [facility], [address] [place] [address], [phone]  [facility]  [place] Office: [address] [address], [phone]  [place] Office: [address] [facility], [address], [place] [address], [phone]  [place] Office: [address] [facility] [place] [address] [phone]  [place] [facility], [address] [place] [address] [phone]   [facility] for Addiction Recovery, [address] [facility], [address], [phone]  [facility]over, [address] [place], [place] [phone]    Residential Substance Abuse Treatment   [facility] [address] [facility], [phone] or [phone]  [facility], [address] [facility], [phone]  [facility] (men only) [address] [place] [address], [place] [address], [phone]  [facility] at the [facility] (women only) [address] [place] [address] [phone]  [facility], [address] [place] [address] [phone]  [facility] (women only), intakes at [address] [facility], [phone]  [facility] (7-day program, $100, [address], [phone], [phone], [phone])  [facility]  (Men only, [phone]), [address] (Vets*/Non-Vets)  [facility] (Men only, $400/month program fee) [address], [phone]  [facility] (Women over age 39 only), [address] [facility], [phone]    Out of Area:    [place], [address], [place], [place] ([phone])  [facility] Recovery Program (men only), [address] [facility] [place] [address], [phone]  [facility], [address] [place], [place] [phone])  [place], [facility] [place], [place] ([phone])  [facility], [address] [facility], [phone]  [facility] (Women only, has to have mental illness, can be homeless or substance abuser), [phone]        DOMESTIC VIOLENCE RESOURCES:     Advocacy  [facility] (NOF[facility])  [address] [address]    [facility] ? [phone]  Services provided: emergency shelter, individual advocacy, information and referrals, group support, children's program, medical advocacy, forensic medical exams, primary care, legal assistance, counseling, safety planning, and caregiver support    [facility] HEALING AND EMPOWERMENT [place]  Confidential location  [facility] ? [phone]  Services provided: short term emergency shelter, all services provided are free of charge    [facility] CENTERS FOR COMMUNITY ADVOCACY  Multiple locations in [place], [facility], [place], [facility], [place], and [facility] ([place], [place], and [place])    [place] ? [phone]  Services provided: emergency shelter, individual advocacy, information and referrals, group support, children's program, medical advocacy, legal assistance in obtaining restraining orders, counseling, safety planning, and caregiver support    [facility]   Emergency Shelter   [phone]  Emergency Services ,Legal and Financial Assistance Services ,Housing Services ,Support Services      [facility]   [phone]  Emergency Services ,Counseling Services , Housing Services ,Support Services ,Children's Services     WOMEN WITH A VISION  [address] [place] [address]  WWAV ? [phone]  Services provided: advocacy, health education and supportive services, specializing in free healing services for marginalized groups, including LGBTQ individuals and sex workers    SEXUAL TRAUMA AWARENESS AND RESPONSE (STAR)  [address] [place] [address]    STAR ? (081) 803-STAR  Services provided: individual advocacy, information and referrals, group support, medical advocacy, legal assistance, counseling, and safety planning for survivors of sexual assault    [facility])  [address] [place] [address]  [facility] Forensic Program ? [phone]  Services provided: free forensic medical exams for sexual assault and domestic violence, which can be performed up to 5 days after an incident. It is not necessary to make a police report to receive a forensic medical exam    Legal  [facility]  [address] [address]  [facility] ? [phone]  Services provided: free emergency legal representation for survivors of doemstic violence residing in [facility]. Legal services may include temporary restraining orders, temporary child support, custody, and use of property    [facility] LEGAL SERVICES (LS)  [address] [address]  SLLS ? [phone]  Services provided: free legal representation for survivors of domestic violence residing in [facility]. Legal services may include temporary child support, custody, and divorce      HOTLINES:     [facility] DOMESTIC VIOLENCE HOTLINE  [phone]    Services provided: free and confidential hotline for victims and survivors of domestic violence. All calls will be routed to a domestic violence service provided in the  victim or survivor's area    NATIONAL HUMAN TRAFFICKING HOTLINE  ([phone]) [phone]    Services provided: national anti-trafficking hotline serving victims and survivors of human trafficking. Provides information about local resources, and access to safe space to report tips, seek services, and ask for help    VIA LINK  211 or [phone]    Service provided: counselors can provide crisis counseling. Counselors can also provide information and referrals to programs which can help with needs such as food, shelter, medical care, financial assistance, mental health services, substance abuse treatment, senior services, childcare, and more      HOMELESS SHELTERS:      Homeless shelters  [facility]  Emergency shelter for individuals and families  [address] [facility]  [phone]  [facility]  Emergency shelter for men only  Meals daily 6am, 2pm, & 6pm  Clothing, case management M-F by appointment (ID/job/housing/legal assistance), mail  [address] [facility]  [phone]  [facility]  Emergency shelter for men  [address]any [facility]  [phone]  Emergency shelter for women  [address] [facility]  [phone]  Breakfast & lunch daily, dinner M-F  Case management, job counseling services   [facility]  Emergency shelter for teens and young adults up to 22yo  [address] [facility]  [phone]  [facility]  Emergency shelter for women over 17yo and their kids  [address] [place] [address]  [phone]  [facility]  Day program, meals M-F 1PM (arrive early)  Showers, laundry, hygiene kits, showers, phones, , notary services, case management, ID assistance  [address] [facility]  [phone] M-F 8am-2:30pm  [facility]  Day program  MTWF 7:30am-3:30pm,  8:30am-3:30pm  Crisis intervention, employment assistance, food/clothing, hygiene kits, bus tokens, mail  [address] [facility]  [phone]  [facility]  Mobile outreach for homeless persons in  [facility]  [phone]  Healthcare for the Homeless  Primary healthcare, case management, dental services, TB placement  Call ahead  [address]  2nd Floor  [phone]  [facility]  Connects homeless people with their loved ones in other cities by providing transportation costs   [phone]      [place] RESOURCES:     [place] Mobile Mental Health Crisis Response Team:    Region 12 ([place], [place], [place], and [facility]) ([facility])  [phone]      Outpatient Mental Health & Addiction Clinic Resources for both [facility]:    [facility] Mental Healthcare Resources  Website: www.[facility] Hospital[facility].org  Main Number: [phone]    [facility] ([facility])  [address] ([address]) [place] [address]  [phone]    [facility] ([facility])  [address] ([address] [facility]) [place] [address]  [phone]    [facility]  [address] ([address]) [place] [address]  [phone]    [facility]  [address] ([address] [facility]) [place] [address]  [phone]      Addiction Treatment Resources for both [facility]:    [facility] (Detox, Residential, PHP, IOP, and Aftercare Programs)  [address] [place] [address]  [phone]    [facility] (Residential, IOP, Transitional Living, and Aftercare Programs)  [address] [facility] [place] [address]  [phone]    [facility] (Inpatient, Residential, Detox, IOP, Outpatient, and Aftercare Programs)  [address] [facility], [place] [address] [phone] or toll free at [phone]      Outpatient Mental Health Psychotherapy Resources for both [facility]:    [place], [facility]  [address]  [place] [address]  [phone]  Specialties: Depression, Anxiety, and Life Transitions    Kaylen Craft, PhD  [address]  [place] [address]  [phone]  Specialties: Testing and Evaluation, Education and Learning Disabilities, and ADHD    Isidra Novoa, [facility] [address] [place] [address]  [phone]  Specialties: Obsessive-Compulsive (OCD), Depression, and Relationship Issues    Luciana Esparza [facility] [address] [facility] [place] [address]  [phone]  Specialties: Trauma & PTSD, Mood Disorders, and Anxiety    Luciana Stephens, PhD, [facility]  [facility] [address] [place] [address]  [phone]  Specialties: Family Conflict, Child, and Relationship Issues    Josette Jones [facility] [place] [address] [phone]  Specialties: Anxiety, Depression, and Anger Management        IN CASE OF SUICIDAL THINKING, call the National Suicide Hotline Number: 988    988 Suicide & Crisis Lifeline: 988 , 4-393-372-TALK (8255)  Provides 24/7, free and confidential support for people in distress, prevention and crisis resources for you or your loved ones, and best practices for professionals.    Call, text or chat.  https://988The Art Commissionline.org

## 2025-02-25 ENCOUNTER — HOSPITAL ENCOUNTER (EMERGENCY)
Facility: HOSPITAL | Age: 60
Discharge: PSYCHIATRIC OR SUBSTANCE USE FACILITY | End: 2025-02-25
Attending: EMERGENCY MEDICINE
Payer: MEDICAID

## 2025-02-25 VITALS
RESPIRATION RATE: 18 BRPM | WEIGHT: 189.81 LBS | DIASTOLIC BLOOD PRESSURE: 58 MMHG | HEART RATE: 85 BPM | TEMPERATURE: 98 F | HEIGHT: 69 IN | SYSTOLIC BLOOD PRESSURE: 147 MMHG | BODY MASS INDEX: 28.11 KG/M2 | OXYGEN SATURATION: 96 %

## 2025-02-25 DIAGNOSIS — R45.851 DEPRESSION WITH SUICIDAL IDEATION: ICD-10-CM

## 2025-02-25 DIAGNOSIS — R45.851 SUICIDAL IDEATION: Primary | ICD-10-CM

## 2025-02-25 DIAGNOSIS — F32.A DEPRESSION WITH SUICIDAL IDEATION: ICD-10-CM

## 2025-02-25 LAB
ALBUMIN SERPL BCP-MCNC: 3.6 G/DL (ref 3.5–5.2)
ALP SERPL-CCNC: 86 U/L (ref 40–150)
ALT SERPL W/O P-5'-P-CCNC: 23 U/L (ref 10–44)
AMPHET+METHAMPHET UR QL: NEGATIVE
ANION GAP SERPL CALC-SCNC: 12 MMOL/L (ref 8–16)
APAP SERPL-MCNC: <3 UG/ML (ref 10–20)
AST SERPL-CCNC: 34 U/L (ref 10–40)
BARBITURATES UR QL SCN>200 NG/ML: NEGATIVE
BASOPHILS # BLD AUTO: 0.07 K/UL (ref 0–0.2)
BASOPHILS NFR BLD: 0.6 % (ref 0–1.9)
BENZODIAZ UR QL SCN>200 NG/ML: ABNORMAL
BILIRUB SERPL-MCNC: 0.2 MG/DL (ref 0.1–1)
BILIRUB UR QL STRIP: NEGATIVE
BUN SERPL-MCNC: 4 MG/DL (ref 6–20)
BZE UR QL SCN: NEGATIVE
CALCIUM SERPL-MCNC: 9.8 MG/DL (ref 8.7–10.5)
CANNABINOIDS UR QL SCN: NEGATIVE
CHLORIDE SERPL-SCNC: 110 MMOL/L (ref 95–110)
CLARITY UR: CLEAR
CO2 SERPL-SCNC: 22 MMOL/L (ref 23–29)
COLOR UR: COLORLESS
CREAT SERPL-MCNC: 0.8 MG/DL (ref 0.5–1.4)
CREAT UR-MCNC: 18.4 MG/DL (ref 23–375)
DIFFERENTIAL METHOD BLD: ABNORMAL
EOSINOPHIL # BLD AUTO: 0.1 K/UL (ref 0–0.5)
EOSINOPHIL NFR BLD: 1.2 % (ref 0–8)
ERYTHROCYTE [DISTWIDTH] IN BLOOD BY AUTOMATED COUNT: 14.3 % (ref 11.5–14.5)
EST. GFR  (NO RACE VARIABLE): >60 ML/MIN/1.73 M^2
ETHANOL SERPL-MCNC: 101 MG/DL
ETHANOL SERPL-MCNC: 143 MG/DL
GLUCOSE SERPL-MCNC: 127 MG/DL (ref 70–110)
GLUCOSE UR QL STRIP: NEGATIVE
HCT VFR BLD AUTO: 36.8 % (ref 40–54)
HCV AB SERPL QL IA: POSITIVE
HCV RNA SERPL QL NAA+PROBE: NOT DETECTED
HCV RNA SPEC NAA+PROBE-ACNC: NOT DETECTED IU/ML
HEP C VIRUS HOLD SPECIMEN: NORMAL
HGB BLD-MCNC: 13.4 G/DL (ref 14–18)
HGB UR QL STRIP: NEGATIVE
IMM GRANULOCYTES # BLD AUTO: 0.05 K/UL (ref 0–0.04)
IMM GRANULOCYTES NFR BLD AUTO: 0.4 % (ref 0–0.5)
KETONES UR QL STRIP: NEGATIVE
LEUKOCYTE ESTERASE UR QL STRIP: NEGATIVE
LIPASE SERPL-CCNC: 16 U/L (ref 4–60)
LYMPHOCYTES # BLD AUTO: 2.9 K/UL (ref 1–4.8)
LYMPHOCYTES NFR BLD: 26.2 % (ref 18–48)
MCH RBC QN AUTO: 34.4 PG (ref 27–31)
MCHC RBC AUTO-ENTMCNC: 36.4 G/DL (ref 32–36)
MCV RBC AUTO: 95 FL (ref 82–98)
METHADONE UR QL SCN>300 NG/ML: NEGATIVE
MONOCYTES # BLD AUTO: 0.7 K/UL (ref 0.3–1)
MONOCYTES NFR BLD: 6 % (ref 4–15)
NEUTROPHILS # BLD AUTO: 7.3 K/UL (ref 1.8–7.7)
NEUTROPHILS NFR BLD: 65.6 % (ref 38–73)
NITRITE UR QL STRIP: NEGATIVE
NRBC BLD-RTO: 0 /100 WBC
OPIATES UR QL SCN: NEGATIVE
PCP UR QL SCN>25 NG/ML: NEGATIVE
PH UR STRIP: 6 [PH] (ref 5–8)
PLATELET # BLD AUTO: 278 K/UL (ref 150–450)
PMV BLD AUTO: 9.8 FL (ref 9.2–12.9)
POTASSIUM SERPL-SCNC: 3.2 MMOL/L (ref 3.5–5.1)
PROT SERPL-MCNC: 8.2 G/DL (ref 6–8.4)
PROT UR QL STRIP: NEGATIVE
RBC # BLD AUTO: 3.89 M/UL (ref 4.6–6.2)
SALICYLATES SERPL-MCNC: <5 MG/DL (ref 15–30)
SARS-COV-2 RDRP RESP QL NAA+PROBE: NEGATIVE
SODIUM SERPL-SCNC: 144 MMOL/L (ref 136–145)
SP GR UR STRIP: <1.005 (ref 1–1.03)
TOXICOLOGY INFORMATION: ABNORMAL
URN SPEC COLLECT METH UR: ABNORMAL
UROBILINOGEN UR STRIP-ACNC: NEGATIVE EU/DL
WBC # BLD AUTO: 11.14 K/UL (ref 3.9–12.7)

## 2025-02-25 PROCEDURE — 80053 COMPREHEN METABOLIC PANEL: CPT | Performed by: EMERGENCY MEDICINE

## 2025-02-25 PROCEDURE — 25000003 PHARM REV CODE 250: Performed by: EMERGENCY MEDICINE

## 2025-02-25 PROCEDURE — 85025 COMPLETE CBC W/AUTO DIFF WBC: CPT | Performed by: EMERGENCY MEDICINE

## 2025-02-25 PROCEDURE — 99285 EMERGENCY DEPT VISIT HI MDM: CPT | Mod: 25

## 2025-02-25 PROCEDURE — 86803 HEPATITIS C AB TEST: CPT | Performed by: EMERGENCY MEDICINE

## 2025-02-25 PROCEDURE — 80143 DRUG ASSAY ACETAMINOPHEN: CPT | Performed by: EMERGENCY MEDICINE

## 2025-02-25 PROCEDURE — 87635 SARS-COV-2 COVID-19 AMP PRB: CPT | Performed by: EMERGENCY MEDICINE

## 2025-02-25 PROCEDURE — 80179 DRUG ASSAY SALICYLATE: CPT | Performed by: EMERGENCY MEDICINE

## 2025-02-25 PROCEDURE — 36415 COLL VENOUS BLD VENIPUNCTURE: CPT | Performed by: EMERGENCY MEDICINE

## 2025-02-25 PROCEDURE — 81003 URINALYSIS AUTO W/O SCOPE: CPT | Mod: 59 | Performed by: EMERGENCY MEDICINE

## 2025-02-25 PROCEDURE — 83690 ASSAY OF LIPASE: CPT | Performed by: EMERGENCY MEDICINE

## 2025-02-25 PROCEDURE — 87522 HEPATITIS C REVRS TRNSCRPJ: CPT | Performed by: EMERGENCY MEDICINE

## 2025-02-25 PROCEDURE — 96372 THER/PROPH/DIAG INJ SC/IM: CPT | Performed by: EMERGENCY MEDICINE

## 2025-02-25 PROCEDURE — 80307 DRUG TEST PRSMV CHEM ANLYZR: CPT | Performed by: EMERGENCY MEDICINE

## 2025-02-25 PROCEDURE — 82077 ASSAY SPEC XCP UR&BREATH IA: CPT | Mod: 91 | Performed by: EMERGENCY MEDICINE

## 2025-02-25 RX ORDER — FOLIC ACID 5 MG/ML
1 INJECTION, SOLUTION INTRAMUSCULAR; INTRAVENOUS; SUBCUTANEOUS ONCE
Status: COMPLETED | OUTPATIENT
Start: 2025-02-25 | End: 2025-02-25

## 2025-02-25 RX ORDER — POTASSIUM CHLORIDE 20 MEQ/1
40 TABLET, EXTENDED RELEASE ORAL
Status: COMPLETED | OUTPATIENT
Start: 2025-02-25 | End: 2025-02-25

## 2025-02-25 RX ADMIN — POTASSIUM CHLORIDE 40 MEQ: 1500 TABLET, EXTENDED RELEASE ORAL at 04:02

## 2025-02-25 RX ADMIN — FOLIC ACID 1 MG: 5 INJECTION, SOLUTION INTRAMUSCULAR; INTRAVENOUS; SUBCUTANEOUS at 04:02

## 2025-02-25 NOTE — ED PROVIDER NOTES
"SCRIBE #1 NOTE: I, Janusz Adam, am scribing for, and in the presence of, Samira Nath MD. I have scribed the entire note.       History     Chief Complaint   Patient presents with    Psychiatric Evaluation     Pt reports being suicidal and homicidal, was picked up [place] [facility] where he was wife a knife around and being verbally aggressive. Pt states "I came to [facility] because its easy to get a gun" He also reports drinking "2 small bottles of vodka"      Review of patient's allergies indicates:  No Known Allergies      History of Present Illness     HPI    2/25/2025, 4:25 AM  History obtained from the patient and medical records      History of Present Illness: Matthew Preston is a 59 y.o. male patient with a PMHx of depression, DM, Hepatitis C, psychiatric care, psychiatric hospitalization, multiple gastric ulcers, schizoaffective disorder, depressive type, and therapy who presents to the Emergency Department for evaluation of SI/HI which were noticed tonight. Pt was found at a [facility] being verbally aggressive and in possession of a knife. He reports drinking two bottles of vodka tonight. Symptoms are constant and moderate in severity. No mitigating or exacerbating factors reported. Patient denies any fever, HA, weakness or dizziness. No prior Tx specified.  No further complaints or concerns at this time.       Arrival mode: Ambulance Service    PCP: Maricruz, Primary Doctor        Past Medical History:  Past Medical History:   Diagnosis Date    Depression     Diabetes mellitus     Hepatitis C     History of psychiatric care     History of psychiatric hospitalization     Multiple gastric ulcers     Psychiatric exam requested by authority     Psychiatric problem     Schizoaffective disorder, depressive type     Therapy        Past Surgical History:  History reviewed. No pertinent surgical history.      Family History:  Family History   Problem Relation Name Age of Onset    No Known " "Problems Mother      No Known Problems Father      No Known Problems Sister      Drug abuse Brother      Alcohol abuse Brother      No Known Problems Brother      No Known Problems Brother      No Known Problems Brother      No Known Problems Sister      No Known Problems Sister      No Known Problems Sister      No Known Problems Sister      No Known Problems Sister      No Known Problems Child      No Known Problems Child      No Known Problems Child      No Known Problems Child      No Known Problems Child      No Known Problems Child         Social History:  Social History     Tobacco Use    Smoking status: Some Days     Current packs/day: 1.00     Types: Cigarettes    Smokeless tobacco: Never   Substance and Sexual Activity    Alcohol use: Yes     Comment: Patient stated that he have "too much"    Drug use: Not Currently    Sexual activity: Yes     Partners: Female     Birth control/protection: Condom        Review of Systems     Review of Systems   Constitutional:  Negative for fever.   HENT:  Negative for sore throat.    Respiratory:  Negative for shortness of breath.    Cardiovascular:  Negative for chest pain.   Gastrointestinal:  Negative for nausea and vomiting.   Genitourinary:  Negative for dysuria.   Musculoskeletal:  Negative for back pain.   Skin:  Negative for rash.   Neurological:  Negative for dizziness and weakness.   Hematological:  Does not bruise/bleed easily.   Psychiatric/Behavioral:  Positive for suicidal ideas.         (+) homicidal ideas      Physical Exam     Initial Vitals [02/25/25 0255]   BP Pulse Resp Temp SpO2   (!) 147/58 85 18 97.6 °F (36.4 °C) 96 %      MAP       --          Physical Exam  Nursing Notes and Vital Signs Reviewed.  Constitutional: Patient is in no acute distress. Well-developed and well-nourished.  Head: Atraumatic. Normocephalic.  Eyes: PERRL. EOM intact. Conjunctivae are not pale. No scleral icterus. L eye injury.  ENT: Mucous membranes are moist. Oropharynx is " "clear and symmetric.    Neck: Supple. Full ROM. No lymphadenopathy.  Cardiovascular: Regular rate. Regular rhythm. No murmurs, rubs, or gallops. Distal pulses are 2+ and symmetric.  Pulmonary/Chest: No respiratory distress. Clear to auscultation bilaterally. No wheezing or rales.  Abdominal: Soft and non-distended.  There is no tenderness.  No rebound, guarding, or rigidity. Good bowel sounds.  Genitourinary: No CVA tenderness.  Musculoskeletal: Moves all extremities. No obvious deformities. No edema. No calf tenderness.  Skin: Warm and dry.  Neurological:  Alert, awake, and appropriate.  Normal speech.  No acute focal neurological deficits are appreciated.   Psychiatric: Appropriate in content.     ED Course   Procedures  ED Vital Signs:  Vitals:    02/25/25 0255 02/25/25 0329   BP: (!) 147/58    Pulse: 85    Resp: 18    Temp: 97.6 °F (36.4 °C)    TempSrc: Oral    SpO2: 96%    Weight:  86.1 kg (189 lb 13.1 oz)   Height: 5' 9" (1.753 m)        Abnormal Lab Results:  Labs Reviewed   URINALYSIS, REFLEX TO URINE CULTURE - Abnormal       Result Value    Specimen UA Urine, Clean Catch      Color, UA Colorless (*)     Appearance, UA Clear      pH, UA 6.0      Specific Gravity, UA <1.005 (*)     Protein, UA Negative      Glucose, UA Negative      Ketones, UA Negative      Bilirubin (UA) Negative      Occult Blood UA Negative      Nitrite, UA Negative      Urobilinogen, UA Negative      Leukocytes, UA Negative      Narrative:     Specimen Source->Urine   DRUG SCREEN PANEL, URINE EMERGENCY - Abnormal    Benzodiazepines Presumptive Positive (*)     Methadone metabolites Negative      Cocaine (Metab.) Negative      Opiate Scrn, Ur Negative      Barbiturate Screen, Ur Negative      Amphetamine Screen, Ur Negative      THC Negative      Phencyclidine Negative      Creatinine, Urine 18.4 (*)     Toxicology Information SEE COMMENT      Narrative:     Specimen Source->Urine   CBC W/ AUTO DIFFERENTIAL - Abnormal    WBC 11.14      " RBC 3.89 (*)     Hemoglobin 13.4 (*)     Hematocrit 36.8 (*)     MCV 95      MCH 34.4 (*)     MCHC 36.4 (*)     RDW 14.3      Platelets 278      MPV 9.8      Immature Granulocytes 0.4      Gran # (ANC) 7.3      Immature Grans (Abs) 0.05 (*)     Lymph # 2.9      Mono # 0.7      Eos # 0.1      Baso # 0.07      nRBC 0      Gran % 65.6      Lymph % 26.2      Mono % 6.0      Eosinophil % 1.2      Basophil % 0.6      Differential Method Automated     COMPREHENSIVE METABOLIC PANEL - Abnormal    Sodium 144      Potassium 3.2 (*)     Chloride 110      CO2 22 (*)     Glucose 127 (*)     BUN 4 (*)     Creatinine 0.8      Calcium 9.8      Total Protein 8.2      Albumin 3.6      Total Bilirubin 0.2      Alkaline Phosphatase 86      AST 34      ALT 23      eGFR >60      Anion Gap 12     ALCOHOL,MEDICAL (ETHANOL) - Abnormal    Alcohol, Serum 143 (*)    ACETAMINOPHEN LEVEL - Abnormal    Acetaminophen (Tylenol), Serum <3.0 (*)    SALICYLATE LEVEL - Abnormal    Salicylate Lvl <5.0 (*)    HEPATITIS C ANTIBODY - Abnormal    Hepatitis C Ab Positive (*)     Narrative:     Release to patient->Immediate   SARS-COV-2 RNA AMPLIFICATION, QUAL    SARS-CoV-2 RNA, Amplification, Qual Negative     HEP C VIRUS HOLD SPECIMEN    HEP C Virus Hold Specimen Hold for HCV sendout      Narrative:     Release to patient->Immediate   LIPASE   LIPASE    Lipase 16     HEPATITIS C RNA, QUANTITATIVE, PCR   ALCOHOL,MEDICAL (ETHANOL)        All Lab Results:  Results for orders placed or performed during the hospital encounter of 02/25/25   Urinalysis, Reflex to Urine Culture Urine, Clean Catch    Collection Time: 02/25/25  3:08 AM    Specimen: Urine   Result Value Ref Range    Specimen UA Urine, Clean Catch     Color, UA Colorless (A) Yellow, Straw, Radha    Appearance, UA Clear Clear    pH, UA 6.0 5.0 - 8.0    Specific Gravity, UA <1.005 (A) 1.005 - 1.030    Protein, UA Negative Negative    Glucose, UA Negative Negative    Ketones, UA Negative Negative     Bilirubin (UA) Negative Negative    Occult Blood UA Negative Negative    Nitrite, UA Negative Negative    Urobilinogen, UA Negative <2.0 EU/dL    Leukocytes, UA Negative Negative   Drug screen panel, emergency    Collection Time: 02/25/25  3:08 AM   Result Value Ref Range    Benzodiazepines Presumptive Positive (A) Negative    Methadone metabolites Negative Negative    Cocaine (Metab.) Negative Negative    Opiate Scrn, Ur Negative Negative    Barbiturate Screen, Ur Negative Negative    Amphetamine Screen, Ur Negative Negative    THC Negative Negative    Phencyclidine Negative Negative    Creatinine, Urine 18.4 (L) 23.0 - 375.0 mg/dL    Toxicology Information SEE COMMENT    CBC auto differential    Collection Time: 02/25/25  3:16 AM   Result Value Ref Range    WBC 11.14 3.90 - 12.70 K/uL    RBC 3.89 (L) 4.60 - 6.20 M/uL    Hemoglobin 13.4 (L) 14.0 - 18.0 g/dL    Hematocrit 36.8 (L) 40.0 - 54.0 %    MCV 95 82 - 98 fL    MCH 34.4 (H) 27.0 - 31.0 pg    MCHC 36.4 (H) 32.0 - 36.0 g/dL    RDW 14.3 11.5 - 14.5 %    Platelets 278 150 - 450 K/uL    MPV 9.8 9.2 - 12.9 fL    Immature Granulocytes 0.4 0.0 - 0.5 %    Gran # (ANC) 7.3 1.8 - 7.7 K/uL    Immature Grans (Abs) 0.05 (H) 0.00 - 0.04 K/uL    Lymph # 2.9 1.0 - 4.8 K/uL    Mono # 0.7 0.3 - 1.0 K/uL    Eos # 0.1 0.0 - 0.5 K/uL    Baso # 0.07 0.00 - 0.20 K/uL    nRBC 0 0 /100 WBC    Gran % 65.6 38.0 - 73.0 %    Lymph % 26.2 18.0 - 48.0 %    Mono % 6.0 4.0 - 15.0 %    Eosinophil % 1.2 0.0 - 8.0 %    Basophil % 0.6 0.0 - 1.9 %    Differential Method Automated    Comprehensive metabolic panel    Collection Time: 02/25/25  3:16 AM   Result Value Ref Range    Sodium 144 136 - 145 mmol/L    Potassium 3.2 (L) 3.5 - 5.1 mmol/L    Chloride 110 95 - 110 mmol/L    CO2 22 (L) 23 - 29 mmol/L    Glucose 127 (H) 70 - 110 mg/dL    BUN 4 (L) 6 - 20 mg/dL    Creatinine 0.8 0.5 - 1.4 mg/dL    Calcium 9.8 8.7 - 10.5 mg/dL    Total Protein 8.2 6.0 - 8.4 g/dL    Albumin 3.6 3.5 - 5.2 g/dL     Total Bilirubin 0.2 0.1 - 1.0 mg/dL    Alkaline Phosphatase 86 40 - 150 U/L    AST 34 10 - 40 U/L    ALT 23 10 - 44 U/L    eGFR >60 >60 mL/min/1.73 m^2    Anion Gap 12 8 - 16 mmol/L   Ethanol    Collection Time: 02/25/25  3:16 AM   Result Value Ref Range    Alcohol, Serum 143 (H) <10 mg/dL   Acetaminophen level    Collection Time: 02/25/25  3:16 AM   Result Value Ref Range    Acetaminophen (Tylenol), Serum <3.0 (L) 10.0 - 20.0 ug/mL   COVID-19 Rapid Screening    Collection Time: 02/25/25  3:16 AM   Result Value Ref Range    SARS-CoV-2 RNA, Amplification, Qual Negative Negative   Salicylate level    Collection Time: 02/25/25  3:16 AM   Result Value Ref Range    Salicylate Lvl <5.0 (L) 15.0 - 30.0 mg/dL   Lipase    Collection Time: 02/25/25  3:16 AM   Result Value Ref Range    Lipase 16 4 - 60 U/L   Hepatitis C Antibody    Collection Time: 02/25/25  3:24 AM   Result Value Ref Range    Hepatitis C Ab Positive (A) Negative   HCV Virus Hold Specimen    Collection Time: 02/25/25  3:24 AM   Result Value Ref Range    HEP C Virus Hold Specimen Hold for HCV sendout        Imaging Results:  Imaging Results    None              The Emergency Provider reviewed the vital signs and test results, which are outlined above.     ED Discussion     5:30 AM: The PEC hold has been issued by Dr. Nath at this time for the following: being currently unsafe to himself and others.    5:49 AM: Pt has been medically cleared by Dr. Nath at this time. Reassessed pt at this time. Pt is resting comfortably and appears in no acute distress. There are no psychiatric services offered at this facility. D/w pt all pertinent ED information and plan to transfer to psychiatric facility for psychiatric treatment. Pt verbalizes understanding. [facility]. Pt has been made aware of all risks and benefits associated with transfer, including but not limited to death, MVC, loss of vital signs,  and/or permanent disability. Benefits include ability to be treated at an inpatient psychiatric facility. Pt will be transported by personnel trained in CPR and CPI. Patient understands that there could be unforeseen motor vehicle accidents, inclement weather, or loss of vital signs that could result in potential death or permanent disability. All questions and complaints have been addressed at this time. Pt condition is stable at this time and is clear to transfer to psychiatric facility at this time.       Medical Decision Making  Amount and/or Complexity of Data Reviewed  Labs: ordered. Decision-making details documented in ED Course.    Risk  Prescription drug management.                ED Medication(s):  Medications   folic acid injection 1 mg (1 mg Intramuscular Given 2/25/25 0452)   potassium chloride SA CR tablet 40 mEq (40 mEq Oral Given 2/25/25 0456)       New Prescriptions    No medications on file               Scribe Attestation:   Scribe #1: I performed the above scribed service and the documentation accurately describes the services I performed. I attest to the accuracy of the note.     Attending:   Physician Attestation Statement for Scribe #1: I, Samira Nath MD, personally performed the services described in this documentation, as scribed by Janusz Adam, in my presence, and it is both accurate and complete.           Clinical Impression       ICD-10-CM ICD-9-CM   1. Suicidal ideation  R45.851 V62.84   2. Depression with suicidal ideation  F32.A 311    R45.851 V62.84       Disposition:   Disposition: Transferred  Condition: Fair         Samira Nath MD  02/25/25 0551

## 2025-05-28 ENCOUNTER — HOSPITAL ENCOUNTER (EMERGENCY)
Facility: HOSPITAL | Age: 60
Discharge: PSYCHIATRIC OR SUBSTANCE USE FACILITY | End: 2025-05-29
Attending: EMERGENCY MEDICINE
Payer: MEDICAID

## 2025-05-28 DIAGNOSIS — F10.929 ALCOHOLIC INTOXICATION WITH COMPLICATION: ICD-10-CM

## 2025-05-28 DIAGNOSIS — Z00.8 MEDICAL CLEARANCE FOR PSYCHIATRIC ADMISSION: Primary | ICD-10-CM

## 2025-05-28 DIAGNOSIS — F32.A DEPRESSION WITH SUICIDAL IDEATION: ICD-10-CM

## 2025-05-28 DIAGNOSIS — Z91.148 NON COMPLIANCE W MEDICATION REGIMEN: ICD-10-CM

## 2025-05-28 DIAGNOSIS — R45.851 DEPRESSION WITH SUICIDAL IDEATION: ICD-10-CM

## 2025-05-28 PROCEDURE — 99285 EMERGENCY DEPT VISIT HI MDM: CPT

## 2025-05-29 VITALS
DIASTOLIC BLOOD PRESSURE: 72 MMHG | TEMPERATURE: 98 F | OXYGEN SATURATION: 99 % | HEART RATE: 78 BPM | RESPIRATION RATE: 16 BRPM | BODY MASS INDEX: 28.11 KG/M2 | HEIGHT: 69 IN | SYSTOLIC BLOOD PRESSURE: 121 MMHG | WEIGHT: 189.81 LBS

## 2025-05-29 LAB
ABSOLUTE EOSINOPHIL (OHS): 0.11 K/UL
ABSOLUTE MONOCYTE (OHS): 0.66 K/UL (ref 0.3–1)
ABSOLUTE NEUTROPHIL COUNT (OHS): 5.2 K/UL (ref 1.8–7.7)
ALBUMIN SERPL BCP-MCNC: 3.9 G/DL (ref 3.5–5.2)
ALP SERPL-CCNC: 75 UNIT/L (ref 40–150)
ALT SERPL W/O P-5'-P-CCNC: 36 UNIT/L (ref 10–44)
AMPHET UR QL SCN: NEGATIVE
ANION GAP (OHS): 14 MMOL/L (ref 8–16)
APAP SERPL-MCNC: <3 UG/ML (ref 10–20)
AST SERPL-CCNC: 65 UNIT/L (ref 11–45)
BARBITURATE SCN PRESENT UR: NEGATIVE
BASOPHILS # BLD AUTO: 0.08 K/UL
BASOPHILS NFR BLD AUTO: 0.8 %
BENZODIAZ UR QL SCN: NEGATIVE
BILIRUB SERPL-MCNC: 0.4 MG/DL (ref 0.1–1)
BILIRUB UR QL STRIP.AUTO: NEGATIVE
BUN SERPL-MCNC: 12 MG/DL (ref 6–20)
CALCIUM SERPL-MCNC: 9.6 MG/DL (ref 8.7–10.5)
CANNABINOIDS UR QL SCN: NEGATIVE
CHLORIDE SERPL-SCNC: 108 MMOL/L (ref 95–110)
CLARITY UR: CLEAR
CO2 SERPL-SCNC: 19 MMOL/L (ref 23–29)
COCAINE UR QL SCN: NEGATIVE
COLOR UR AUTO: YELLOW
CREAT SERPL-MCNC: 1.1 MG/DL (ref 0.5–1.4)
CREAT UR-MCNC: 89.6 MG/DL (ref 23–375)
ERYTHROCYTE [DISTWIDTH] IN BLOOD BY AUTOMATED COUNT: 13.9 % (ref 11.5–14.5)
ETHANOL SERPL-MCNC: 132 MG/DL
ETHANOL SERPL-MCNC: 168 MG/DL
GFR SERPLBLD CREATININE-BSD FMLA CKD-EPI: >60 ML/MIN/1.73/M2
GLUCOSE SERPL-MCNC: 158 MG/DL (ref 70–110)
GLUCOSE UR QL STRIP: NEGATIVE
HCT VFR BLD AUTO: 45.8 % (ref 40–54)
HGB BLD-MCNC: 15.5 GM/DL (ref 14–18)
HGB UR QL STRIP: NEGATIVE
HOLD SPECIMEN: NORMAL
IMM GRANULOCYTES # BLD AUTO: 0.03 K/UL (ref 0–0.04)
IMM GRANULOCYTES NFR BLD AUTO: 0.3 % (ref 0–0.5)
KETONES UR QL STRIP: NEGATIVE
LEUKOCYTE ESTERASE UR QL STRIP: NEGATIVE
LYMPHOCYTES # BLD AUTO: 3.74 K/UL (ref 1–4.8)
MCH RBC QN AUTO: 31.1 PG (ref 27–31)
MCHC RBC AUTO-ENTMCNC: 33.8 G/DL (ref 32–36)
MCV RBC AUTO: 92 FL (ref 82–98)
METHADONE UR QL SCN: NEGATIVE
NITRITE UR QL STRIP: NEGATIVE
NUCLEATED RBC (/100WBC) (OHS): 0 /100 WBC
OPIATES UR QL SCN: NEGATIVE
PCP UR QL: NEGATIVE
PH UR STRIP: 6 [PH]
PLATELET # BLD AUTO: 230 K/UL (ref 150–450)
PMV BLD AUTO: 10.2 FL (ref 9.2–12.9)
POTASSIUM SERPL-SCNC: 3.3 MMOL/L (ref 3.5–5.1)
PROT SERPL-MCNC: 8.5 GM/DL (ref 6–8.4)
PROT UR QL STRIP: NEGATIVE
RBC # BLD AUTO: 4.98 M/UL (ref 4.6–6.2)
RELATIVE EOSINOPHIL (OHS): 1.1 %
RELATIVE LYMPHOCYTE (OHS): 38.1 % (ref 18–48)
RELATIVE MONOCYTE (OHS): 6.7 % (ref 4–15)
RELATIVE NEUTROPHIL (OHS): 53 % (ref 38–73)
SARS-COV-2 RDRP RESP QL NAA+PROBE: NEGATIVE
SODIUM SERPL-SCNC: 141 MMOL/L (ref 136–145)
SP GR UR STRIP: 1.01
UROBILINOGEN UR STRIP-ACNC: NEGATIVE EU/DL
WBC # BLD AUTO: 9.82 K/UL (ref 3.9–12.7)

## 2025-05-29 PROCEDURE — 80143 DRUG ASSAY ACETAMINOPHEN: CPT | Performed by: EMERGENCY MEDICINE

## 2025-05-29 PROCEDURE — 80053 COMPREHEN METABOLIC PANEL: CPT | Performed by: EMERGENCY MEDICINE

## 2025-05-29 PROCEDURE — 85025 COMPLETE CBC W/AUTO DIFF WBC: CPT | Performed by: EMERGENCY MEDICINE

## 2025-05-29 PROCEDURE — 81003 URINALYSIS AUTO W/O SCOPE: CPT | Performed by: EMERGENCY MEDICINE

## 2025-05-29 PROCEDURE — 80307 DRUG TEST PRSMV CHEM ANLYZR: CPT | Performed by: EMERGENCY MEDICINE

## 2025-05-29 PROCEDURE — U0002 COVID-19 LAB TEST NON-CDC: HCPCS | Performed by: EMERGENCY MEDICINE

## 2025-05-29 PROCEDURE — 82077 ASSAY SPEC XCP UR&BREATH IA: CPT | Performed by: EMERGENCY MEDICINE

## 2025-07-19 ENCOUNTER — HOSPITAL ENCOUNTER (EMERGENCY)
Facility: HOSPITAL | Age: 60
Discharge: PSYCHIATRIC OR SUBSTANCE USE FACILITY | End: 2025-07-20
Attending: EMERGENCY MEDICINE
Payer: MEDICAID

## 2025-07-19 DIAGNOSIS — F32.A DEPRESSION WITH SUICIDAL IDEATION: ICD-10-CM

## 2025-07-19 DIAGNOSIS — R45.850 HOMICIDAL IDEATION: ICD-10-CM

## 2025-07-19 DIAGNOSIS — N30.00 ACUTE CYSTITIS WITHOUT HEMATURIA: ICD-10-CM

## 2025-07-19 DIAGNOSIS — Z00.8 MEDICAL CLEARANCE FOR PSYCHIATRIC ADMISSION: Primary | ICD-10-CM

## 2025-07-19 DIAGNOSIS — E87.6 HYPOKALEMIA: ICD-10-CM

## 2025-07-19 DIAGNOSIS — E83.42 HYPOMAGNESEMIA: ICD-10-CM

## 2025-07-19 DIAGNOSIS — R45.851 DEPRESSION WITH SUICIDAL IDEATION: ICD-10-CM

## 2025-07-19 DIAGNOSIS — F10.920 ALCOHOLIC INTOXICATION WITHOUT COMPLICATION: ICD-10-CM

## 2025-07-19 LAB
ABSOLUTE EOSINOPHIL (OHS): 0.1 K/UL
ABSOLUTE MONOCYTE (OHS): 0.68 K/UL (ref 0.3–1)
ABSOLUTE NEUTROPHIL COUNT (OHS): 4.72 K/UL (ref 1.8–7.7)
BASOPHILS # BLD AUTO: 0.09 K/UL
BASOPHILS NFR BLD AUTO: 0.9 %
BILIRUB UR QL STRIP.AUTO: NEGATIVE
CLARITY UR: CLEAR
COLOR UR AUTO: COLORLESS
ERYTHROCYTE [DISTWIDTH] IN BLOOD BY AUTOMATED COUNT: 13.7 % (ref 11.5–14.5)
GLUCOSE UR QL STRIP: NEGATIVE
HCT VFR BLD AUTO: 41.2 % (ref 40–54)
HGB BLD-MCNC: 13.9 GM/DL (ref 14–18)
HGB UR QL STRIP: ABNORMAL
IMM GRANULOCYTES # BLD AUTO: 0.03 K/UL (ref 0–0.04)
IMM GRANULOCYTES NFR BLD AUTO: 0.3 % (ref 0–0.5)
KETONES UR QL STRIP: NEGATIVE
LEUKOCYTE ESTERASE UR QL STRIP: ABNORMAL
LYMPHOCYTES # BLD AUTO: 4.36 K/UL (ref 1–4.8)
MCH RBC QN AUTO: 31.3 PG (ref 27–31)
MCHC RBC AUTO-ENTMCNC: 33.7 G/DL (ref 32–36)
MCV RBC AUTO: 93 FL (ref 82–98)
MICROSCOPIC COMMENT: ABNORMAL
NITRITE UR QL STRIP: NEGATIVE
NUCLEATED RBC (/100WBC) (OHS): 0 /100 WBC
PH UR STRIP: 7 [PH]
PLATELET # BLD AUTO: 254 K/UL (ref 150–450)
PMV BLD AUTO: 10.3 FL (ref 9.2–12.9)
PROT UR QL STRIP: NEGATIVE
RBC # BLD AUTO: 4.44 M/UL (ref 4.6–6.2)
RBC #/AREA URNS AUTO: 1 /HPF (ref 0–4)
RELATIVE EOSINOPHIL (OHS): 1 %
RELATIVE LYMPHOCYTE (OHS): 43.7 % (ref 18–48)
RELATIVE MONOCYTE (OHS): 6.8 % (ref 4–15)
RELATIVE NEUTROPHIL (OHS): 47.3 % (ref 38–73)
SP GR UR STRIP: 1
UROBILINOGEN UR STRIP-ACNC: NEGATIVE EU/DL
WBC # BLD AUTO: 9.98 K/UL (ref 3.9–12.7)
WBC #/AREA URNS AUTO: 50 /HPF (ref 0–5)

## 2025-07-19 PROCEDURE — 87389 HIV-1 AG W/HIV-1&-2 AB AG IA: CPT | Performed by: EMERGENCY MEDICINE

## 2025-07-19 PROCEDURE — 83735 ASSAY OF MAGNESIUM: CPT | Performed by: EMERGENCY MEDICINE

## 2025-07-19 PROCEDURE — 80307 DRUG TEST PRSMV CHEM ANLYZR: CPT | Performed by: EMERGENCY MEDICINE

## 2025-07-19 PROCEDURE — 87086 URINE CULTURE/COLONY COUNT: CPT | Performed by: EMERGENCY MEDICINE

## 2025-07-19 PROCEDURE — 80179 DRUG ASSAY SALICYLATE: CPT | Performed by: EMERGENCY MEDICINE

## 2025-07-19 PROCEDURE — 85025 COMPLETE CBC W/AUTO DIFF WBC: CPT | Performed by: EMERGENCY MEDICINE

## 2025-07-19 PROCEDURE — 80143 DRUG ASSAY ACETAMINOPHEN: CPT | Performed by: EMERGENCY MEDICINE

## 2025-07-19 PROCEDURE — 82077 ASSAY SPEC XCP UR&BREATH IA: CPT | Performed by: EMERGENCY MEDICINE

## 2025-07-19 PROCEDURE — 81001 URINALYSIS AUTO W/SCOPE: CPT | Performed by: EMERGENCY MEDICINE

## 2025-07-19 PROCEDURE — 25000003 PHARM REV CODE 250: Performed by: EMERGENCY MEDICINE

## 2025-07-19 PROCEDURE — 84443 ASSAY THYROID STIM HORMONE: CPT | Performed by: EMERGENCY MEDICINE

## 2025-07-19 PROCEDURE — U0002 COVID-19 LAB TEST NON-CDC: HCPCS | Performed by: EMERGENCY MEDICINE

## 2025-07-19 PROCEDURE — 99285 EMERGENCY DEPT VISIT HI MDM: CPT

## 2025-07-19 PROCEDURE — 80053 COMPREHEN METABOLIC PANEL: CPT | Performed by: EMERGENCY MEDICINE

## 2025-07-19 RX ORDER — QUETIAPINE FUMARATE 25 MG/1
25 TABLET, FILM COATED ORAL ONCE
Status: COMPLETED | OUTPATIENT
Start: 2025-07-19 | End: 2025-07-19

## 2025-07-19 RX ADMIN — QUETIAPINE FUMARATE 25 MG: 25 TABLET ORAL at 11:07

## 2025-07-20 ENCOUNTER — HOSPITAL ENCOUNTER (INPATIENT)
Facility: HOSPITAL | Age: 60
LOS: 5 days | Discharge: HOME | DRG: 897 | End: 2025-07-25
Attending: PSYCHIATRY & NEUROLOGY | Admitting: PSYCHIATRY & NEUROLOGY
Payer: MEDICAID

## 2025-07-20 VITALS
HEART RATE: 86 BPM | SYSTOLIC BLOOD PRESSURE: 136 MMHG | TEMPERATURE: 98 F | DIASTOLIC BLOOD PRESSURE: 72 MMHG | OXYGEN SATURATION: 98 % | RESPIRATION RATE: 16 BRPM

## 2025-07-20 DIAGNOSIS — F32.A DEPRESSION: Primary | ICD-10-CM

## 2025-07-20 DIAGNOSIS — Z00.8 MEDICAL CLEARANCE FOR PSYCHIATRIC ADMISSION: ICD-10-CM

## 2025-07-20 DIAGNOSIS — R45.851 SUICIDAL IDEATION: ICD-10-CM

## 2025-07-20 DIAGNOSIS — F25.0 SCHIZOAFFECTIVE DISORDER, BIPOLAR TYPE: ICD-10-CM

## 2025-07-20 PROBLEM — I10 PRIMARY HYPERTENSION: Status: ACTIVE | Noted: 2025-07-20

## 2025-07-20 LAB
ALBUMIN SERPL BCP-MCNC: 3.7 G/DL (ref 3.5–5.2)
ALP SERPL-CCNC: 71 UNIT/L (ref 40–150)
ALT SERPL W/O P-5'-P-CCNC: 51 UNIT/L (ref 10–44)
AMPHET UR QL SCN: NEGATIVE
ANION GAP (OHS): 10 MMOL/L (ref 8–16)
APAP SERPL-MCNC: <3 UG/ML (ref 10–20)
AST SERPL-CCNC: 43 UNIT/L (ref 11–45)
BARBITURATE SCN PRESENT UR: NEGATIVE
BENZODIAZ UR QL SCN: NEGATIVE
BILIRUB SERPL-MCNC: 0.2 MG/DL (ref 0.1–1)
BUN SERPL-MCNC: 10 MG/DL (ref 6–20)
CALCIUM SERPL-MCNC: 10.1 MG/DL (ref 8.7–10.5)
CANNABINOIDS UR QL SCN: NEGATIVE
CHLORIDE SERPL-SCNC: 108 MMOL/L (ref 95–110)
CO2 SERPL-SCNC: 22 MMOL/L (ref 23–29)
COCAINE UR QL SCN: NEGATIVE
CREAT SERPL-MCNC: 0.8 MG/DL (ref 0.5–1.4)
CREAT UR-MCNC: 49.4 MG/DL (ref 23–375)
ETHANOL SERPL-MCNC: 176 MG/DL
ETHANOL SERPL-MCNC: <10 MG/DL
GFR SERPLBLD CREATININE-BSD FMLA CKD-EPI: >60 ML/MIN/1.73/M2
GLUCOSE SERPL-MCNC: 100 MG/DL (ref 70–110)
HIV 1+2 AB+HIV1 P24 AG SERPL QL IA: NEGATIVE
MAGNESIUM SERPL-MCNC: 1.3 MG/DL (ref 1.6–2.6)
METHADONE UR QL SCN: NEGATIVE
OPIATES UR QL SCN: NEGATIVE
PCP UR QL: NEGATIVE
POCT GLUCOSE: 108 MG/DL (ref 70–110)
POCT GLUCOSE: 111 MG/DL (ref 70–110)
POTASSIUM SERPL-SCNC: 3.3 MMOL/L (ref 3.5–5.1)
PROT SERPL-MCNC: 7.6 GM/DL (ref 6–8.4)
SALICYLATES SERPL-MCNC: <5 MG/DL (ref 15–30)
SARS-COV-2 RDRP RESP QL NAA+PROBE: NEGATIVE
SODIUM SERPL-SCNC: 140 MMOL/L (ref 136–145)
TSH SERPL-ACNC: 1.57 UIU/ML (ref 0.4–4)

## 2025-07-20 PROCEDURE — 99223 1ST HOSP IP/OBS HIGH 75: CPT | Mod: AF,HB,, | Performed by: PSYCHIATRY & NEUROLOGY

## 2025-07-20 PROCEDURE — 25000003 PHARM REV CODE 250: Performed by: EMERGENCY MEDICINE

## 2025-07-20 PROCEDURE — 25000003 PHARM REV CODE 250: Performed by: STUDENT IN AN ORGANIZED HEALTH CARE EDUCATION/TRAINING PROGRAM

## 2025-07-20 PROCEDURE — 82077 ASSAY SPEC XCP UR&BREATH IA: CPT | Performed by: EMERGENCY MEDICINE

## 2025-07-20 PROCEDURE — 11400000 HC PSYCH PRIVATE ROOM

## 2025-07-20 PROCEDURE — 25000003 PHARM REV CODE 250: Performed by: PSYCHIATRY & NEUROLOGY

## 2025-07-20 RX ORDER — POTASSIUM CHLORIDE 20 MEQ/1
40 TABLET, EXTENDED RELEASE ORAL 2 TIMES DAILY
Status: COMPLETED | OUTPATIENT
Start: 2025-07-20 | End: 2025-07-21

## 2025-07-20 RX ORDER — LORAZEPAM 1 MG/1
1 TABLET ORAL
Status: COMPLETED | OUTPATIENT
Start: 2025-07-20 | End: 2025-07-20

## 2025-07-20 RX ORDER — LANOLIN ALCOHOL/MO/W.PET/CERES
400 CREAM (GRAM) TOPICAL 2 TIMES DAILY
Status: COMPLETED | OUTPATIENT
Start: 2025-07-20 | End: 2025-07-21

## 2025-07-20 RX ORDER — CALCIUM CARBONATE 200(500)MG
1000 TABLET,CHEWABLE ORAL EVERY 8 HOURS PRN
Status: DISCONTINUED | OUTPATIENT
Start: 2025-07-20 | End: 2025-07-26 | Stop reason: HOSPADM

## 2025-07-20 RX ORDER — ONDANSETRON 4 MG/1
4 TABLET, ORALLY DISINTEGRATING ORAL EVERY 8 HOURS PRN
Status: DISCONTINUED | OUTPATIENT
Start: 2025-07-20 | End: 2025-07-26 | Stop reason: HOSPADM

## 2025-07-20 RX ORDER — IBUPROFEN 200 MG
16 TABLET ORAL
Status: DISCONTINUED | OUTPATIENT
Start: 2025-07-20 | End: 2025-07-22

## 2025-07-20 RX ORDER — POTASSIUM CHLORIDE 20 MEQ/1
40 TABLET, EXTENDED RELEASE ORAL
Status: COMPLETED | OUTPATIENT
Start: 2025-07-20 | End: 2025-07-20

## 2025-07-20 RX ORDER — POTASSIUM CHLORIDE 750 MG/1
10 TABLET, EXTENDED RELEASE ORAL DAILY
Qty: 7 TABLET | Refills: 0 | Status: SHIPPED | OUTPATIENT
Start: 2025-07-20 | End: 2025-07-20

## 2025-07-20 RX ORDER — LORAZEPAM 1 MG/1
2 TABLET ORAL EVERY 4 HOURS PRN
Status: DISCONTINUED | OUTPATIENT
Start: 2025-07-20 | End: 2025-07-26 | Stop reason: HOSPADM

## 2025-07-20 RX ORDER — GUAIFENESIN 100 MG/5ML
200 LIQUID ORAL EVERY 4 HOURS PRN
Status: DISCONTINUED | OUTPATIENT
Start: 2025-07-20 | End: 2025-07-26 | Stop reason: HOSPADM

## 2025-07-20 RX ORDER — CALCIUM CARBONATE 300MG(750)
400 TABLET,CHEWABLE ORAL 2 TIMES DAILY
Qty: 14 TABLET | Refills: 0 | Status: SHIPPED | OUTPATIENT
Start: 2025-07-20 | End: 2025-07-20

## 2025-07-20 RX ORDER — TALC
6 POWDER (GRAM) TOPICAL NIGHTLY PRN
Status: DISCONTINUED | OUTPATIENT
Start: 2025-07-20 | End: 2025-07-26 | Stop reason: HOSPADM

## 2025-07-20 RX ORDER — CALCIUM CARBONATE 300MG(750)
400 TABLET,CHEWABLE ORAL 2 TIMES DAILY
Qty: 14 TABLET | Refills: 0 | Status: ON HOLD | OUTPATIENT
Start: 2025-07-20 | End: 2025-07-25

## 2025-07-20 RX ORDER — DIPHENHYDRAMINE HYDROCHLORIDE 50 MG/ML
50 INJECTION, SOLUTION INTRAMUSCULAR; INTRAVENOUS EVERY 4 HOURS PRN
Status: DISCONTINUED | OUTPATIENT
Start: 2025-07-20 | End: 2025-07-26 | Stop reason: HOSPADM

## 2025-07-20 RX ORDER — HALOPERIDOL 5 MG/1
5 TABLET ORAL EVERY 4 HOURS PRN
Status: DISCONTINUED | OUTPATIENT
Start: 2025-07-20 | End: 2025-07-26 | Stop reason: HOSPADM

## 2025-07-20 RX ORDER — CEFDINIR 300 MG/1
300 CAPSULE ORAL 2 TIMES DAILY
Qty: 14 CAPSULE | Refills: 0 | Status: ON HOLD | OUTPATIENT
Start: 2025-07-20 | End: 2025-07-25

## 2025-07-20 RX ORDER — IBUPROFEN 200 MG
1 TABLET ORAL DAILY PRN
Status: DISCONTINUED | OUTPATIENT
Start: 2025-07-20 | End: 2025-07-26 | Stop reason: HOSPADM

## 2025-07-20 RX ORDER — DIPHENHYDRAMINE HCL 25 MG
50 CAPSULE ORAL EVERY 4 HOURS PRN
Status: DISCONTINUED | OUTPATIENT
Start: 2025-07-20 | End: 2025-07-26 | Stop reason: HOSPADM

## 2025-07-20 RX ORDER — POTASSIUM CHLORIDE 750 MG/1
10 TABLET, EXTENDED RELEASE ORAL DAILY
Qty: 7 TABLET | Refills: 0 | Status: ON HOLD | OUTPATIENT
Start: 2025-07-20 | End: 2025-07-25

## 2025-07-20 RX ORDER — SERTRALINE HYDROCHLORIDE 50 MG/1
50 TABLET, FILM COATED ORAL DAILY
Status: DISCONTINUED | OUTPATIENT
Start: 2025-07-20 | End: 2025-07-22

## 2025-07-20 RX ORDER — QUETIAPINE FUMARATE 100 MG/1
100 TABLET, FILM COATED ORAL NIGHTLY
Status: DISCONTINUED | OUTPATIENT
Start: 2025-07-20 | End: 2025-07-21

## 2025-07-20 RX ORDER — FOLIC ACID 1 MG/1
1 TABLET ORAL DAILY
Status: DISCONTINUED | OUTPATIENT
Start: 2025-07-20 | End: 2025-07-26 | Stop reason: HOSPADM

## 2025-07-20 RX ORDER — INSULIN ASPART 100 [IU]/ML
0-5 INJECTION, SOLUTION INTRAVENOUS; SUBCUTANEOUS
Status: DISCONTINUED | OUTPATIENT
Start: 2025-07-20 | End: 2025-07-22

## 2025-07-20 RX ORDER — THIAMINE HCL 100 MG
100 TABLET ORAL DAILY
Status: DISCONTINUED | OUTPATIENT
Start: 2025-07-20 | End: 2025-07-26 | Stop reason: HOSPADM

## 2025-07-20 RX ORDER — DIAZEPAM 5 MG/1
10 TABLET ORAL 3 TIMES DAILY
Status: DISCONTINUED | OUTPATIENT
Start: 2025-07-20 | End: 2025-07-21

## 2025-07-20 RX ORDER — HYDROXYZINE PAMOATE 50 MG/1
50 CAPSULE ORAL EVERY 6 HOURS PRN
Status: DISCONTINUED | OUTPATIENT
Start: 2025-07-20 | End: 2025-07-26 | Stop reason: HOSPADM

## 2025-07-20 RX ORDER — ACETAMINOPHEN 325 MG/1
650 TABLET ORAL EVERY 6 HOURS PRN
Status: DISCONTINUED | OUTPATIENT
Start: 2025-07-20 | End: 2025-07-26 | Stop reason: HOSPADM

## 2025-07-20 RX ORDER — CEFDINIR 300 MG/1
300 CAPSULE ORAL ONCE
Status: COMPLETED | OUTPATIENT
Start: 2025-07-20 | End: 2025-07-20

## 2025-07-20 RX ORDER — AMLODIPINE BESYLATE 10 MG/1
10 TABLET ORAL DAILY
Status: DISCONTINUED | OUTPATIENT
Start: 2025-07-20 | End: 2025-07-26 | Stop reason: HOSPADM

## 2025-07-20 RX ORDER — LANOLIN ALCOHOL/MO/W.PET/CERES
400 CREAM (GRAM) TOPICAL ONCE
Status: COMPLETED | OUTPATIENT
Start: 2025-07-20 | End: 2025-07-20

## 2025-07-20 RX ORDER — LOSARTAN POTASSIUM 50 MG/1
50 TABLET ORAL DAILY
Status: DISCONTINUED | OUTPATIENT
Start: 2025-07-20 | End: 2025-07-26 | Stop reason: HOSPADM

## 2025-07-20 RX ORDER — LORAZEPAM 2 MG/ML
2 INJECTION INTRAMUSCULAR EVERY 4 HOURS PRN
Status: DISCONTINUED | OUTPATIENT
Start: 2025-07-20 | End: 2025-07-26 | Stop reason: HOSPADM

## 2025-07-20 RX ORDER — CEFDINIR 300 MG/1
300 CAPSULE ORAL 2 TIMES DAILY
Qty: 14 CAPSULE | Refills: 0 | Status: SHIPPED | OUTPATIENT
Start: 2025-07-20 | End: 2025-07-20

## 2025-07-20 RX ORDER — HALOPERIDOL LACTATE 5 MG/ML
5 INJECTION, SOLUTION INTRAMUSCULAR EVERY 4 HOURS PRN
Status: DISCONTINUED | OUTPATIENT
Start: 2025-07-20 | End: 2025-07-26 | Stop reason: HOSPADM

## 2025-07-20 RX ORDER — GLUCAGON 1 MG
1 KIT INJECTION
Status: DISCONTINUED | OUTPATIENT
Start: 2025-07-20 | End: 2025-07-22

## 2025-07-20 RX ORDER — ATORVASTATIN CALCIUM 20 MG/1
20 TABLET, FILM COATED ORAL NIGHTLY
Status: DISCONTINUED | OUTPATIENT
Start: 2025-07-20 | End: 2025-07-26 | Stop reason: HOSPADM

## 2025-07-20 RX ORDER — IBUPROFEN 200 MG
24 TABLET ORAL
Status: DISCONTINUED | OUTPATIENT
Start: 2025-07-20 | End: 2025-07-22

## 2025-07-20 RX ADMIN — DIAZEPAM 10 MG: 5 TABLET ORAL at 11:07

## 2025-07-20 RX ADMIN — QUETIAPINE FUMARATE 100 MG: 100 TABLET ORAL at 08:07

## 2025-07-20 RX ADMIN — POTASSIUM CHLORIDE 40 MEQ: 1500 TABLET, FILM COATED, EXTENDED RELEASE ORAL at 11:07

## 2025-07-20 RX ADMIN — DIAZEPAM 10 MG: 5 TABLET ORAL at 08:07

## 2025-07-20 RX ADMIN — Medication 100 MG: at 11:07

## 2025-07-20 RX ADMIN — THERA TABS 1 TABLET: TAB at 11:07

## 2025-07-20 RX ADMIN — POTASSIUM CHLORIDE 40 MEQ: 1500 TABLET, FILM COATED, EXTENDED RELEASE ORAL at 08:07

## 2025-07-20 RX ADMIN — LOSARTAN POTASSIUM 50 MG: 50 TABLET, FILM COATED ORAL at 11:07

## 2025-07-20 RX ADMIN — LORAZEPAM 1 MG: 1 TABLET ORAL at 02:07

## 2025-07-20 RX ADMIN — POTASSIUM CHLORIDE 40 MEQ: 1500 TABLET, EXTENDED RELEASE ORAL at 01:07

## 2025-07-20 RX ADMIN — ATORVASTATIN CALCIUM 20 MG: 20 TABLET, FILM COATED ORAL at 08:07

## 2025-07-20 RX ADMIN — CEFDINIR 300 MG: 300 CAPSULE ORAL at 01:07

## 2025-07-20 RX ADMIN — Medication 400 MG: at 11:07

## 2025-07-20 RX ADMIN — Medication 400 MG: at 08:07

## 2025-07-20 RX ADMIN — FOLIC ACID 1 MG: 1 TABLET ORAL at 11:07

## 2025-07-20 RX ADMIN — Medication 400 MG: at 01:07

## 2025-07-20 RX ADMIN — SERTRALINE HYDROCHLORIDE 50 MG: 50 TABLET ORAL at 11:07

## 2025-07-20 RX ADMIN — AMLODIPINE BESYLATE 10 MG: 10 TABLET ORAL at 11:07

## 2025-07-20 RX ADMIN — DIAZEPAM 10 MG: 5 TABLET ORAL at 02:07

## 2025-07-20 NOTE — H&P
[facility] Hospital Medicine  History & Physical    Patient Name: Matthew Preston  MRN: [id number]  Patient Class: IP- Psych  Admission Date: 7/20/2025  Attending Physician: Sharad Ulloa MD   Primary Care Provider: Maricruz Primary Doctor         Patient information was obtained from patient and ER records.     Subjective:     Principal Problem:Medical clearance for psychiatric admission    Chief Complaint: No chief complaint on file.       HPI: Patient Mattehw Preston is a 59 y.o. male with a PMHx of  has a past medical history of Depression, Diabetes mellitus, Hepatitis C antibody positive in blood (01/12/2025), History of psychiatric care, History of psychiatric hospitalization, Multiple gastric ulcers, Psychiatric exam requested by authority, Psychiatric problem, Schizoaffective disorder, depressive type, and Therapy., presents for medical clearance to begin psychiatric evaluation.  Patient reports worsening depression and suicidal ideation for the last month, reporting he has a plan.  Patient endorses auditory hallucinations and has not been taking his medications.  Labs are notable for hypokalemia and hypomagnesemia.  His blood pressure has remained elevated and is prescribed Norvasc and losartan outpatient.  He has a history of type 2 diabetes, currently only on metformin 500 mg b.i.d. however noncompliant.      Past Medical History:   Diagnosis Date    Depression     Diabetes mellitus     Hepatitis C antibody positive in blood 01/12/2025    RNA NEGATIVE    History of psychiatric care     History of psychiatric hospitalization     Multiple gastric ulcers     Psychiatric exam requested by authority     Psychiatric problem     Schizoaffective disorder, depressive type     Therapy        No past surgical history on file.    Review of patient's allergies indicates:  No Known Allergies    Current Facility-Administered Medications on File Prior to Encounter   Medication     [COMPLETED] cefdinir capsule 300 mg    [COMPLETED] LORazepam tablet 1 mg    [COMPLETED] magnesium oxide tablet 400 mg    [COMPLETED] potassium chloride SA CR tablet 40 mEq    [COMPLETED] QUEtiapine tablet 25 mg     Current Outpatient Medications on File Prior to Encounter   Medication Sig    amLODIPine (NORVASC) 10 MG tablet Take 1 tablet (10 mg total) by mouth once daily.    ARIPiprazole (ABILIFY) 10 MG Tab Take 1 tablet (10 mg total) by mouth once daily.    atorvastatin (LIPITOR) 20 MG tablet Take 1 tablet (20 mg total) by mouth every evening.    cefdinir (OMNICEF) 300 MG capsule Take 1 capsule (300 mg total) by mouth 2 (two) times daily. for 7 days    citalopram (CELEXA) 40 MG tablet Take 40 mg by mouth once daily.    folic acid (FOLVITE) 1 MG tablet Take 1 tablet (1 mg total) by mouth once daily.    losartan (COZAAR) 50 MG tablet Take 1 tablet (50 mg total) by mouth once daily.    magnesium oxide 400 mg magnesium Tab Take 400 mg by mouth 2 (two) times a day. for 7 days    metFORMIN (GLUCOPHAGE) 500 MG tablet Take 1 tablet (500 mg total) by mouth 2 (two) times daily with meals.    pantoprazole (PROTONIX) 40 MG tablet Take 1 tablet (40 mg total) by mouth once daily.    potassium chloride (KLOR-CON) 10 MEQ TbSR Take 1 tablet (10 mEq total) by mouth once daily. for 7 days    [DISCONTINUED] cefdinir (OMNICEF) 300 MG capsule Take 1 capsule (300 mg total) by mouth 2 (two) times daily. for 7 days    [DISCONTINUED] magnesium oxide 400 mg magnesium Tab Take 400 mg by mouth 2 (two) times a day. for 7 days    [DISCONTINUED] potassium chloride (KLOR-CON) 10 MEQ TbSR Take 1 tablet (10 mEq total) by mouth once daily. for 7 days     Family History       Problem Relation (Age of Onset)    Alcohol abuse Brother    Drug abuse Brother    No Known Problems Mother, Father, Sister, Brother, Brother, Brother, Sister, Sister, Sister, Sister, Sister, Child, Child, Child, Child, Child, Child          Tobacco Use    Smoking status: Some  "Days     Current packs/day: 1.00     Types: Cigarettes    Smokeless tobacco: Never   Substance and Sexual Activity    Alcohol use: Yes     Comment: Patient stated that he have "too much"    Drug use: Not Currently    Sexual activity: Yes     Partners: Female     Birth control/protection: Condom     Review of Systems   Constitutional: Negative.    HENT: Negative.     Eyes: Negative.    Respiratory: Negative.     Cardiovascular: Negative.    Gastrointestinal: Negative.    Endocrine: Negative.    Genitourinary: Negative.    Musculoskeletal: Negative.    Skin: Negative.    Neurological: Negative.    Hematological: Negative.    Psychiatric/Behavioral:  Positive for dysphoric mood, self-injury and suicidal ideas.      Objective:     Vital Signs (Most Recent):  Temp: 97.8 °F (36.6 °C) (07/20/25 0813)  Pulse: 70 (07/20/25 0813)  Resp: 18 (07/20/25 0813)  BP: (!) 174/105 (07/20/25 0813)  SpO2: 100 % (07/20/25 0813) Vital Signs (24h Range):  Temp:  [97.8 °F (36.6 °C)-98.1 °F (36.7 °C)] 97.8 °F (36.6 °C)  Pulse:  [] 70  Resp:  [16-20] 18  SpO2:  [96 %-100 %] 100 %  BP: (136-174)/() 174/105     Weight: 86.1 kg (189 lb 13.1 oz)  Body mass index is 28.03 kg/m².     Physical Exam  Constitutional:       General: He is not in acute distress.  HENT:      Head: Normocephalic.      Nose: Nose normal.      Mouth/Throat:      Mouth: Mucous membranes are moist.   Eyes:      Extraocular Movements: Extraocular movements intact.   Cardiovascular:      Rate and Rhythm: Normal rate and regular rhythm.      Pulses: Normal pulses.      Heart sounds: No murmur heard.  Pulmonary:      Effort: Pulmonary effort is normal. No respiratory distress.   Abdominal:      Palpations: Abdomen is soft.      Tenderness: There is no abdominal tenderness.   Musculoskeletal:         General: No swelling. Normal range of motion.      Cervical back: Normal range of motion.   Skin:     General: Skin is warm.      Capillary Refill: Capillary refill takes " "less than 2 seconds.   Neurological:      Mental Status: He is alert. Mental status is at baseline.   Psychiatric:      Comments: labile                Recent Labs   Lab 07/19/25  2345   WBC 9.98   HGB 13.9*   HCT 41.2   MCV 93   RBC 4.44*   MCH 31.3*   MCHC 33.7   RDW 13.7      MPV 10.3   LYMPH 4.36  43.7   MONO 6.8  0.68   BASOPHIL 0.9  0.09       Recent Labs   Lab 07/19/25  2351      K 3.3*      CO2 22*   BUN 10   CREATININE 0.8      CALCIUM 10.1   PROT 7.6   ALBUMIN 3.7   ALKPHOS 71   BILITOT 0.2   ALT 51*   AST 43   ANIONGAP 10   MG 1.3*       Recent Labs   Lab 07/19/25  2345   COLORU Colorless*   APPEARANCEUA Clear   PHUR 7.0   SPECGRAV 1.005   PROTEINUA Negative   GLUCUA Negative   BILIRUBINUA Negative   OCCULTUA 1+*   UROBILINOGEN Negative   NITRITE Negative   LEUKOCYTESUR 2+*   RBCUA 1   WBCUA 50*       No results for input(s): "PH", "PCO2", "PO2", "HCO3", "POCSATURATED", "BE" in the last 168 hours.    No results for input(s): "TROPONINI", "CPK", "CPKMB" in the last 168 hours.    No results for input(s): "PT", "INR", "APTT" in the last 168 hours.    Lab Results   Component Value Date    HGBA1C 7.3 (H) 12/01/2019       Recent Labs   Lab 07/19/25  2345   TSH 1.570       Microbiology Results (last 7 days)       ** No results found for the last 168 hours. **            No results found.    Assessment/Plan:     Assessment & Plan  Medical clearance for psychiatric admission  See problem list below  Patient is cleared to begin psychiatric evaluation    Type 2 diabetes mellitus without complication, without long-term current use of insulin  Patient's FSGs are controlled on current medication regimen.  Last A1c reviewed-   Lab Results   Component Value Date    HGBA1C 7.3 (H) 12/01/2019     Most recent fingerstick glucose reviewed- No results for input(s): "POCTGLUCOSE" in the last 24 hours.  Current correctional scale  Low  Maintain anti-hyperglycemic dose as follows- "   Antihyperglycemics (From admission, onward)      Start     Stop Route Frequency Ordered    07/20/25 0930  insulin aspart U-100 pen 0-5 Units         -- SubQ Before meals & nightly PRN 07/20/25 0831          Hold Oral hypoglycemics while patient is in the hospital.        Suicidal ideation  Defer to psych    Primary hypertension  Patient's blood pressure range in the last 24 hours was: BP  Min: 136/72  Max: 174/105.The patient's inpatient anti-hypertensive regimen is listed below:  Current Antihypertensives  amLODIPine tablet 10 mg, Daily, Oral  losartan tablet 50 mg, Daily, Oral    Plan  - BP is uncontrolled, will adjust as follows:    - restart home meds  VTE Risk Mitigation (From admission, onward)      None                                        Felipe Thornton MD  Department of Hospital Medicine  [facility]

## 2025-07-20 NOTE — ED PROVIDER NOTES
"SCRIBE #1 NOTE: I, Jessica Waldrop, am scribing for, and in the presence of, Santa Cesar DO. I have scribed the HPI, ROS, and PEx.     SCRIBE #2 NOTE: I, Naina Natarajan, am scribing for, and in the presence of,  Anderson Moran Jr., MD. I have scribed the remaining portions of the note not scribed by Scribe #1.      History     Chief Complaint   Patient presents with    Psychiatric Evaluation     Pt reports SI with no plan x couple weeks. Noncompliant with medication. Denies hallucinations at this time. -HI.      Review of patient's allergies indicates:  No Known Allergies      History of Present Illness     HPI    7/19/2025, 11:42 PM  History obtained from the patient and medical records      History of Present Illness: Matthew Preston is a 59 y.o. male patient with a PMHx of depression, psychiatric hospitalization, schizoaffective disorder, DM, and gastric ulcers who presents to the Emergency Department for psychiatric evaluation. Pt states that he has been experiencing depression and SI for the past few weeks. When asked if he has a plan, pt stated "I do, but I'm not going to tell you." Pt also states that he is hearing voices that are telling him "all kinds of shit." He is not compliant with his daily medications. Pt expresses that he has been wanting to hurt people, but will not elaborate because he thinks the police will be called. He admits to EtOH use, but denies any drug use. Patient denies any visual hallucinations. No prior Tx specified.  No further complaints or concerns at this time.       Arrival mode: Ambulance Service    PCP: No, Primary Doctor        Past Medical History:  Past Medical History:   Diagnosis Date    Depression     Diabetes mellitus     Hepatitis C antibody positive in blood 01/12/2025    RNA NEGATIVE    History of psychiatric care     History of psychiatric hospitalization     Multiple gastric ulcers     Psychiatric exam requested by authority     Psychiatric problem     " "Schizoaffective disorder, depressive type     Therapy        Past Surgical History:  History reviewed. No pertinent surgical history.      Family History:  Family History   Problem Relation Name Age of Onset    No Known Problems Mother      No Known Problems Father      No Known Problems Sister      Drug abuse Brother      Alcohol abuse Brother      No Known Problems Brother      No Known Problems Brother      No Known Problems Brother      No Known Problems Sister      No Known Problems Sister      No Known Problems Sister      No Known Problems Sister      No Known Problems Sister      No Known Problems Child      No Known Problems Child      No Known Problems Child      No Known Problems Child      No Known Problems Child      No Known Problems Child         Social History:  Social History     Tobacco Use    Smoking status: Some Days     Current packs/day: 1.00     Types: Cigarettes    Smokeless tobacco: Never   Substance and Sexual Activity    Alcohol use: Yes     Comment: Patient stated that he have "too much"    Drug use: Not Currently    Sexual activity: Yes     Partners: Female     Birth control/protection: Condom        Review of Systems     Review of Systems   Psychiatric/Behavioral:  Positive for dysphoric mood, hallucinations (AH) and suicidal ideas.         (-) VH  (+) HI      Physical Exam     Initial Vitals [07/19/25 2327]   BP Pulse Resp Temp SpO2   (!) 143/74 104 20 98.1 °F (36.7 °C) 96 %      MAP       --          Physical Exam  Nursing Notes and Vital Signs Reviewed.  Constitutional: Patient is in no acute distress.   Head: Atraumatic. Normocephalic.  Cardiovascular: Regular rate. Regular rhythm. No murmurs, rubs, or gallops. Pulmonary/Chest: No respiratory distress. Clear to auscultation bilaterally. No wheezing or rales.  Abdominal: Soft and non-distended. There is no tenderness.    Musculoskeletal: Moves all extremities. No obvious deformities.   Skin: Warm and dry.  Neurological:  Alert, " awake, and appropriate.  No acute focal neurological deficits are appreciated.  Psychiatric: Paranoid.     ED Course   Procedures  ED Vital Signs:  Vitals:    07/19/25 2327   BP: (!) 143/74   Pulse: 104   Resp: 20   Temp: 98.1 °F (36.7 °C)   TempSrc: Oral   SpO2: 96%       Abnormal Lab Results:  Labs Reviewed   COMPREHENSIVE METABOLIC PANEL - Abnormal       Result Value    Sodium 140      Potassium 3.3 (*)     Chloride 108      CO2 22 (*)     Glucose 100      BUN 10      Creatinine 0.8      Calcium 10.1      Protein Total 7.6      Albumin 3.7      Bilirubin Total 0.2      ALP 71      AST 43      ALT 51 (*)     Anion Gap 10      eGFR >60     URINALYSIS, REFLEX TO URINE CULTURE - Abnormal    Color, UA Colorless (*)     Appearance, UA Clear      pH, UA 7.0      Spec Grav UA 1.005      Protein, UA Negative      Glucose, UA Negative      Ketones, UA Negative      Bilirubin, UA Negative      Blood, UA 1+ (*)     Nitrites, UA Negative      Urobilinogen, UA Negative      Leukocyte Esterase, UA 2+ (*)    ALCOHOL,MEDICAL (ETHANOL) - Abnormal    Alcohol, Serum 176 (*)    ACETAMINOPHEN LEVEL - Abnormal    Acetaminophen Level <3.0 (*)    SALICYLATE LEVEL - Abnormal    Salicylate Level <5.0 (*)    CBC WITH DIFFERENTIAL - Abnormal    WBC 9.98      RBC 4.44 (*)     HGB 13.9 (*)     HCT 41.2      MCV 93      MCH 31.3 (*)     MCHC 33.7      RDW 13.7      Platelet Count 254      MPV 10.3      Nucleated RBC 0      Neut % 47.3      Lymph % 43.7      Mono % 6.8      Eos % 1.0      Basophil % 0.9      Imm Grans % 0.3      Neut # 4.72      Lymph # 4.36      Mono # 0.68      Eos # 0.10      Baso # 0.09      Imm Grans # 0.03     URINALYSIS MICROSCOPIC - Abnormal    RBC, UA 1      WBC, UA 50 (*)     Microscopic Comment       MAGNESIUM - Abnormal    Magnesium  1.3 (*)    HIV 1 / 2 ANTIBODY - Normal    HIV 1/2 Ag/Ab Negative     DRUG SCREEN PANEL, URINE EMERGENCY - Normal    Benzodiazepine, Urine Negative      Methadone, Urine Negative       "Cocaine, Urine Negative      Opiates, Urine Negative      Barbiturates, Urine Negative      Amphetamines, Urine Negative      THC Negative      Phencyclidine, Urine Negative      Urine Creatinine 49.4      Narrative:     This screen includes the following classes of drugs at the listed cut-off:     Benzodiazepines:        200 ng/ml   Methadone:              300 ng/ml   Cocaine metabolite:     300 ng/ml   Opiates:                300 ng/ml   Barbiturates:           200 ng/ml   Amphetamines:           1000 ng/ml   Marijuana metabs (THC): 50 ng/ml   Phencyclidine (PCP):    25 ng/ml     This is a screening test. If results do not correlate with clinical presentation, then a confirmatory send out test is advised.    This report is intended for use in clinical monitoring and management of patients. It is not intended for use in employment related drug testing."   SARS-COV-2 RNA AMPLIFICATION, QUAL - Normal    SARS COV-2 Molecular Negative     TSH - Normal    TSH 1.570     ALCOHOL,MEDICAL (ETHANOL) - Normal    Alcohol, Serum <10     CULTURE, URINE   CBC W/ AUTO DIFFERENTIAL    Narrative:     The following orders were created for panel order CBC auto differential.  Procedure                               Abnormality         Status                     ---------                               -----------         ------                     CBC with Differential[[id number]]       Abnormal            Final result                 Please view results for these tests on the individual orders.   [facility] URINE HOLD        All Lab Results:  Results for orders placed or performed during the hospital encounter of 07/19/25   HIV 1/2 Ag/Ab (4th Gen)    Collection Time: 07/19/25 11:45 PM   Result Value Ref Range    HIV 1/2 Ag/Ab Negative Negative   Urinalysis, Reflex to Urine Culture Urine, Clean Catch    Collection Time: 07/19/25 11:45 PM    Specimen: Urine   Result Value Ref Range    Color, UA Colorless (A) Straw, Radha, Yellow, " Light[place]    Appearance, UA Clear Clear    pH, UA 7.0 5.0 - 8.0    Spec Grav UA 1.005 1.005 - 1.030    Protein, UA Negative Negative    Glucose, UA Negative Negative    Ketones, UA Negative Negative    Bilirubin, UA Negative Negative    Blood, UA 1+ (A) Negative    Nitrites, UA Negative Negative    Urobilinogen, UA Negative <2.0 EU/dL    Leukocyte Esterase, UA 2+ (A) Negative   TSH    Collection Time: 07/19/25 11:45 PM   Result Value Ref Range    TSH 1.570 0.400 - 4.000 uIU/mL   CBC with Differential    Collection Time: 07/19/25 11:45 PM   Result Value Ref Range    WBC 9.98 3.90 - 12.70 K/uL    RBC 4.44 (L) 4.60 - 6.20 M/uL    HGB 13.9 (L) 14.0 - 18.0 gm/dL    HCT 41.2 40.0 - 54.0 %    MCV 93 82 - 98 fL    MCH 31.3 (H) 27.0 - 31.0 pg    MCHC 33.7 32.0 - 36.0 g/dL    RDW 13.7 11.5 - 14.5 %    Platelet Count 254 150 - 450 K/uL    MPV 10.3 9.2 - 12.9 fL    Nucleated RBC 0 <=0 /100 WBC    Neut % 47.3 38 - 73 %    Lymph % 43.7 18 - 48 %    Mono % 6.8 4 - 15 %    Eos % 1.0 <=8 %    Basophil % 0.9 <=1.9 %    Imm Grans % 0.3 0.0 - 0.5 %    Neut # 4.72 1.8 - 7.7 K/uL    Lymph # 4.36 1 - 4.8 K/uL    Mono # 0.68 0.3 - 1 K/uL    Eos # 0.10 <=0.5 K/uL    Baso # 0.09 <=0.2 K/uL    Imm Grans # 0.03 0.00 - 0.04 K/uL   Urinalysis Microscopic    Collection Time: 07/19/25 11:45 PM   Result Value Ref Range    RBC, UA 1 0 - 4 /HPF    WBC, UA 50 (H) 0 - 5 /HPF    Microscopic Comment     Drug screen panel, emergency    Collection Time: 07/19/25 11:46 PM   Result Value Ref Range    Benzodiazepine, Urine Negative Negative    Methadone, Urine Negative Negative    Cocaine, Urine Negative Negative    Opiates, Urine Negative Negative    Barbiturates, Urine Negative Negative    Amphetamines, Urine Negative Negative    THC Negative Negative    Phencyclidine, Urine Negative Negative    Urine Creatinine 49.4 23.0 - 375.0 mg/dL   COVID-19 Rapid Screening    Collection Time: 07/19/25 11:47 PM   Result Value Ref Range    SARS COV-2 Molecular  Negative Negative   Comprehensive metabolic panel    Collection Time: 07/19/25 11:51 PM   Result Value Ref Range    Sodium 140 136 - 145 mmol/L    Potassium 3.3 (L) 3.5 - 5.1 mmol/L    Chloride 108 95 - 110 mmol/L    CO2 22 (L) 23 - 29 mmol/L    Glucose 100 70 - 110 mg/dL    BUN 10 6 - 20 mg/dL    Creatinine 0.8 0.5 - 1.4 mg/dL    Calcium 10.1 8.7 - 10.5 mg/dL    Protein Total 7.6 6.0 - 8.4 gm/dL    Albumin 3.7 3.5 - 5.2 g/dL    Bilirubin Total 0.2 0.1 - 1.0 mg/dL    ALP 71 40 - 150 unit/L    AST 43 11 - 45 unit/L    ALT 51 (H) 10 - 44 unit/L    Anion Gap 10 8 - 16 mmol/L    eGFR >60 >60 mL/min/1.73/m2   Ethanol    Collection Time: 07/19/25 11:51 PM   Result Value Ref Range    Alcohol, Serum 176 (H) <10 mg/dL   Acetaminophen level    Collection Time: 07/19/25 11:51 PM   Result Value Ref Range    Acetaminophen Level <3.0 (L) 10.0 - 20.0 ug/ml   Salicylate level    Collection Time: 07/19/25 11:51 PM   Result Value Ref Range    Salicylate Level <5.0 (L) 15.0 - 30.0 mg/dL   Magnesium    Collection Time: 07/19/25 11:51 PM   Result Value Ref Range    Magnesium  1.3 (L) 1.6 - 2.6 mg/dL   Ethanol    Collection Time: 07/20/25  2:08 AM   Result Value Ref Range    Alcohol, Serum <10 <10 mg/dL       Imaging Results:  Imaging Results    None              The Emergency Provider reviewed the vital signs and test results, which are outlined above.     ED Discussion     11:54 PM: The PEC hold has been issued by Dr. Cesar at this time for the following: being unable to seek voluntary admission and being a danger to self and others.    2:00 AM: Dr. Cesar transfers care of patient to Dr. Moran pending repeat lab results.     2:18 AM: Dr. Moran agrees with Dr. Cesar's assessment and plan of care. Re-evaluated pt. Patient is resting comfortably and is in no acute distress.  Discussed with pt and/or family/caretaker all pertinent results. Discussed with pt and/or family/caretaker any concerns expressed at this time. Answered all  questions. Pt and/or family/caretaker express understanding at this time.    3:08 AM: Pt has been medically cleared by Dr. Moran at this time. Reassessed pt at this time. Pt is resting comfortably and appears in no acute distress. There are no psychiatric services offered at this facility. D/w pt all pertinent ED information and plan to transfer to psychiatric facility for psychiatric treatment. Pt verbalizes understanding. Patient being transferred by [facility] for ongoing personal protection en route. Pt has been made aware of all risks and benefits associated with transfer, including but not limited to death, MVC, loss of vital signs, and/or permanent disability. Benefits include ability to be treated at an inpatient psychiatric facility. Pt will be transported by personnel trained in CPR and CPI. Patient understands that there could be unforeseen motor vehicle accidents, inclement weather, or loss of vital signs that could result in potential death or permanent disability. All questions and complaints have been addressed at this time. Pt condition is stable at this time and is clear to transfer to psychiatric facility at this time.     ED Course as of 07/20/25 0345   Sun Jul 20, 2025   0156 Magnesium (!): 1.3 [NF]   0156 Potassium(!): 3.3 [NF]   0159 Alcohol, Serum(!): 176 [NF]      ED Course User Index  [NF] Santa Cesar, DO     Medical Decision Making  Amount and/or Complexity of Data Reviewed  Labs: ordered. Decision-making details documented in ED Course.    Risk  OTC drugs.  Prescription drug management.  Parenteral controlled substances.  Decision regarding hospitalization.  Risk Details: OTC drugs, prescription drugs and controlled substances considered.  Due to patient's symptoms improving and pain controlled pain medications ordered appropriately.  Differential diagnoses:  CVA, infection, pneumonia, urinary tract infection, intra-abdominal pathology, renal failure, dehydration, electrolyte  abnormality or metabolic cause y, drug affect, hyperglycemia hypoglycemia, drug drug interaction, psychiatric illness, meningitis encephalitis, seizure, vasculitis, heart failure, arrhythmia, endocarditis among others.                  ED Medication(s):  Medications   QUEtiapine tablet 25 mg (25 mg Oral Given 7/19/25 2345)   potassium chloride SA CR tablet 40 mEq (40 mEq Oral Given 7/20/25 0134)   magnesium oxide tablet 400 mg (400 mg Oral Given 7/20/25 0133)   cefdinir capsule 300 mg (300 mg Oral Given 7/20/25 0134)   LORazepam tablet 1 mg (1 mg Oral Given 7/20/25 0218)       Current Discharge Medication List        START taking these medications    Details   cefdinir (OMNICEF) 300 MG capsule Take 1 capsule (300 mg total) by mouth 2 (two) times daily. for 7 days  Qty: 14 capsule, Refills: 0      magnesium oxide 400 mg magnesium Tab Take 400 mg by mouth 2 (two) times a day. for 7 days  Qty: 14 tablet, Refills: 0      potassium chloride (KLOR-CON) 10 MEQ TbSR Take 1 tablet (10 mEq total) by mouth once daily. for 7 days  Qty: 7 tablet, Refills: 0              Follow-up Information       [facility] [place] 1 week.    [facility] information:  [id number] [address] [facility] [facility]  [address] [facility] [address] [phone]                                 Scribe Attestation:   Scribe #1: I performed the above scribed service and the documentation accurately describes the services I performed. I attest to the accuracy of the note.     Attending:   Physician Attestation Statement for Scribe #1: I, Santa Cesar DO, personally performed the services described in this documentation, as scribed by Jessica Waldrop, in my presence, and it is both accurate and complete.       Scribe Attestation:   Scribe #2: I performed the above scribed service and the documentation accurately describes the services I performed. I attest to the accuracy of the note.    Attending Attestation:           Physician Attestation for  Scribe:    Physician Attestation Statement for Scribe #2: I, Anderson Moran Jr., MD, reviewed documentation, as scribed by Naina Natarajan in my presence, and it is both accurate and complete. I also acknowledge and confirm the content of the note done by Scribe #1.           Clinical Impression       ICD-10-CM ICD-9-CM   1. Medical clearance for psychiatric admission  Z00.8 V70.8   2. Hypokalemia  E87.6 276.8   3. Acute cystitis without hematuria  N30.00 595.0   4. Alcoholic intoxication without complication  F10.920 305.00   5. Depression with suicidal ideation  F32.A 311    R45.851 V62.84   6. Homicidal ideation  R45.850 V62.85   7. Hypomagnesemia  E83.42 275.2       Disposition:   Disposition: Transferred (Psych)  Condition: Stable        Anderson Moran Jr., MD  07/20/25 0345

## 2025-07-20 NOTE — ASSESSMENT & PLAN NOTE
"Patient's FSGs are controlled on current medication regimen.  Last A1c reviewed-   Lab Results   Component Value Date    HGBA1C 7.3 (H) 12/01/2019     Most recent fingerstick glucose reviewed- No results for input(s): "POCTGLUCOSE" in the last 24 hours.  Current correctional scale  Low  Maintain anti-hyperglycemic dose as follows-   Antihyperglycemics (From admission, onward)      Start     Stop Route Frequency Ordered    07/20/25 0930  insulin aspart U-100 pen 0-5 Units         -- SubQ Before meals & nightly PRN 07/20/25 0831          Hold Oral hypoglycemics while patient is in the hospital.        "

## 2025-07-20 NOTE — PLAN OF CARE
"Matthew Preston admitted to [facility] under the care of Julio Cesar Munoz MD with diagnosis of depression with suicidal and homcidal ideations. [facility] Patient is a Male who presented to [facility] on [address]    Patient withdrawn, depressed, calm, cooperative, quiet, anxious, sleeping, and showing pressured speech. Endorses Suicidal Ideation, Homicidal Ideation, and Auditory Hallucinations. Denies Visual Hallucinations, Tactile Hallucinations, Gustatory Hallucinations, and Delusions.Patient is a 60yo male presents with suicidal and homcidal ideations. Patient has a plan but would not disclose details due to concerns of law enforcement interventions. Patient non-compliant with medications. Patient has been drinking before arrival his ETOH was 176. Patient has a history of depression, DM, gastric ulcers, schizoaffective disorder, and Hep C. Patient admits to hearing voices "saying all kinds of shit."         Patient assessed and oriented to room unit and routine. Patient denies pain or discomfort at present and remains injury-free.     YONG PARK RN    "
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
## 2025-07-20 NOTE — HPI
Patient Matthew Preston is a 59 y.o. male with a PMHx of  has a past medical history of Depression, Diabetes mellitus, Hepatitis C antibody positive in blood (01/12/2025), History of psychiatric care, History of psychiatric hospitalization, Multiple gastric ulcers, Psychiatric exam requested by authority, Psychiatric problem, Schizoaffective disorder, depressive type, and Therapy., presents for medical clearance to begin psychiatric evaluation.  Patient reports worsening depression and suicidal ideation for the last month, reporting he has a plan.  Patient endorses auditory hallucinations and has not been taking his medications.  Labs are notable for hypokalemia and hypomagnesemia.  His blood pressure has remained elevated and is prescribed Norvasc and losartan outpatient.  He has a history of type 2 diabetes, currently only on metformin 500 mg b.i.d. however noncompliant.

## 2025-07-20 NOTE — SUBJECTIVE & OBJECTIVE
Past Medical History:   Diagnosis Date    Depression     Diabetes mellitus     Hepatitis C antibody positive in blood 01/12/2025    RNA NEGATIVE    History of psychiatric care     History of psychiatric hospitalization     Multiple gastric ulcers     Psychiatric exam requested by authority     Psychiatric problem     Schizoaffective disorder, depressive type     Therapy        No past surgical history on file.    Review of patient's allergies indicates:  No Known Allergies    Current Facility-Administered Medications on File Prior to Encounter   Medication    [COMPLETED] cefdinir capsule 300 mg    [COMPLETED] LORazepam tablet 1 mg    [COMPLETED] magnesium oxide tablet 400 mg    [COMPLETED] potassium chloride SA CR tablet 40 mEq    [COMPLETED] QUEtiapine tablet 25 mg     Current Outpatient Medications on File Prior to Encounter   Medication Sig    amLODIPine (NORVASC) 10 MG tablet Take 1 tablet (10 mg total) by mouth once daily.    ARIPiprazole (ABILIFY) 10 MG Tab Take 1 tablet (10 mg total) by mouth once daily.    atorvastatin (LIPITOR) 20 MG tablet Take 1 tablet (20 mg total) by mouth every evening.    cefdinir (OMNICEF) 300 MG capsule Take 1 capsule (300 mg total) by mouth 2 (two) times daily. for 7 days    citalopram (CELEXA) 40 MG tablet Take 40 mg by mouth once daily.    folic acid (FOLVITE) 1 MG tablet Take 1 tablet (1 mg total) by mouth once daily.    losartan (COZAAR) 50 MG tablet Take 1 tablet (50 mg total) by mouth once daily.    magnesium oxide 400 mg magnesium Tab Take 400 mg by mouth 2 (two) times a day. for 7 days    metFORMIN (GLUCOPHAGE) 500 MG tablet Take 1 tablet (500 mg total) by mouth 2 (two) times daily with meals.    pantoprazole (PROTONIX) 40 MG tablet Take 1 tablet (40 mg total) by mouth once daily.    potassium chloride (KLOR-CON) 10 MEQ TbSR Take 1 tablet (10 mEq total) by mouth once daily. for 7 days    [DISCONTINUED] cefdinir (OMNICEF) 300 MG capsule Take 1 capsule (300 mg total) by mouth  "2 (two) times daily. for 7 days    [DISCONTINUED] magnesium oxide 400 mg magnesium Tab Take 400 mg by mouth 2 (two) times a day. for 7 days    [DISCONTINUED] potassium chloride (KLOR-CON) 10 MEQ TbSR Take 1 tablet (10 mEq total) by mouth once daily. for 7 days     Family History       Problem Relation (Age of Onset)    Alcohol abuse Brother    Drug abuse Brother    No Known Problems Mother, Father, Sister, Brother, Brother, Brother, Sister, Sister, Sister, Sister, Sister, Child, Child, Child, Child, Child, Child          Tobacco Use    Smoking status: Some Days     Current packs/day: 1.00     Types: Cigarettes    Smokeless tobacco: Never   Substance and Sexual Activity    Alcohol use: Yes     Comment: Patient stated that he have "too much"    Drug use: Not Currently    Sexual activity: Yes     Partners: Female     Birth control/protection: Condom     Review of Systems   Constitutional: Negative.    HENT: Negative.     Eyes: Negative.    Respiratory: Negative.     Cardiovascular: Negative.    Gastrointestinal: Negative.    Endocrine: Negative.    Genitourinary: Negative.    Musculoskeletal: Negative.    Skin: Negative.    Neurological: Negative.    Hematological: Negative.    Psychiatric/Behavioral:  Positive for dysphoric mood, self-injury and suicidal ideas.      Objective:     Vital Signs (Most Recent):  Temp: 97.8 °F (36.6 °C) (07/20/25 0813)  Pulse: 70 (07/20/25 0813)  Resp: 18 (07/20/25 0813)  BP: (!) 174/105 (07/20/25 0813)  SpO2: 100 % (07/20/25 0813) Vital Signs (24h Range):  Temp:  [97.8 °F (36.6 °C)-98.1 °F (36.7 °C)] 97.8 °F (36.6 °C)  Pulse:  [] 70  Resp:  [16-20] 18  SpO2:  [96 %-100 %] 100 %  BP: (136-174)/() 174/105     Weight: 86.1 kg (189 lb 13.1 oz)  Body mass index is 28.03 kg/m².     Physical Exam  Constitutional:       General: He is not in acute distress.  HENT:      Head: Normocephalic.      Nose: Nose normal.      Mouth/Throat:      Mouth: Mucous membranes are moist.   Eyes:      " "Extraocular Movements: Extraocular movements intact.   Cardiovascular:      Rate and Rhythm: Normal rate and regular rhythm.      Pulses: Normal pulses.      Heart sounds: No murmur heard.  Pulmonary:      Effort: Pulmonary effort is normal. No respiratory distress.   Abdominal:      Palpations: Abdomen is soft.      Tenderness: There is no abdominal tenderness.   Musculoskeletal:         General: No swelling. Normal range of motion.      Cervical back: Normal range of motion.   Skin:     General: Skin is warm.      Capillary Refill: Capillary refill takes less than 2 seconds.   Neurological:      Mental Status: He is alert. Mental status is at baseline.   Psychiatric:      Comments: labile                Recent Labs   Lab 07/19/25  2345   WBC 9.98   HGB 13.9*   HCT 41.2   MCV 93   RBC 4.44*   MCH 31.3*   MCHC 33.7   RDW 13.7      MPV 10.3   LYMPH 4.36  43.7   MONO 6.8  0.68   BASOPHIL 0.9  0.09       Recent Labs   Lab 07/19/25  2351      K 3.3*      CO2 22*   BUN 10   CREATININE 0.8      CALCIUM 10.1   PROT 7.6   ALBUMIN 3.7   ALKPHOS 71   BILITOT 0.2   ALT 51*   AST 43   ANIONGAP 10   MG 1.3*       Recent Labs   Lab 07/19/25  2345   COLORU Colorless*   APPEARANCEUA Clear   PHUR 7.0   SPECGRAV 1.005   PROTEINUA Negative   GLUCUA Negative   BILIRUBINUA Negative   OCCULTUA 1+*   UROBILINOGEN Negative   NITRITE Negative   LEUKOCYTESUR 2+*   RBCUA 1   WBCUA 50*       No results for input(s): "PH", "PCO2", "PO2", "HCO3", "POCSATURATED", "BE" in the last 168 hours.    No results for input(s): "TROPONINI", "CPK", "CPKMB" in the last 168 hours.    No results for input(s): "PT", "INR", "APTT" in the last 168 hours.    Lab Results   Component Value Date    HGBA1C 7.3 (H) 12/01/2019       Recent Labs   Lab 07/19/25  2345   TSH 1.570       Microbiology Results (last 7 days)       ** No results found for the last 168 hours. **            No results found.    "

## 2025-07-20 NOTE — ASSESSMENT & PLAN NOTE
Patient's blood pressure range in the last 24 hours was: BP  Min: 136/72  Max: 174/105.The patient's inpatient anti-hypertensive regimen is listed below:  Current Antihypertensives  amLODIPine tablet 10 mg, Daily, Oral  losartan tablet 50 mg, Daily, Oral    Plan  - BP is uncontrolled, will adjust as follows:    - restart home meds

## 2025-07-21 LAB
BACTERIA UR CULT: NO GROWTH
CHOLEST SERPL-MCNC: 88 MG/DL (ref 120–199)
CHOLEST/HDLC SERPL: 2.1 {RATIO} (ref 2–5)
EAG (OHS): 120 MG/DL (ref 68–131)
HBA1C MFR BLD: 5.8 % (ref 4–5.6)
HDLC SERPL-MCNC: 42 MG/DL (ref 40–75)
HDLC SERPL: 47.7 % (ref 20–50)
HOLD SPECIMEN: NORMAL
LDLC SERPL CALC-MCNC: 19.2 MG/DL (ref 63–159)
NONHDLC SERPL-MCNC: 46 MG/DL
POCT GLUCOSE: 100 MG/DL (ref 70–110)
POCT GLUCOSE: 117 MG/DL (ref 70–110)
POCT GLUCOSE: 145 MG/DL (ref 70–110)
TRIGL SERPL-MCNC: 134 MG/DL (ref 30–150)

## 2025-07-21 PROCEDURE — 11400000 HC PSYCH PRIVATE ROOM

## 2025-07-21 PROCEDURE — 83036 HEMOGLOBIN GLYCOSYLATED A1C: CPT | Performed by: PSYCHIATRY & NEUROLOGY

## 2025-07-21 PROCEDURE — 36415 COLL VENOUS BLD VENIPUNCTURE: CPT | Performed by: PSYCHIATRY & NEUROLOGY

## 2025-07-21 PROCEDURE — 90833 PSYTX W PT W E/M 30 MIN: CPT | Mod: SA,HB,, | Performed by: PSYCHIATRY & NEUROLOGY

## 2025-07-21 PROCEDURE — 25000003 PHARM REV CODE 250: Performed by: PSYCHIATRY & NEUROLOGY

## 2025-07-21 PROCEDURE — 25000003 PHARM REV CODE 250: Performed by: STUDENT IN AN ORGANIZED HEALTH CARE EDUCATION/TRAINING PROGRAM

## 2025-07-21 PROCEDURE — 99232 SBSQ HOSP IP/OBS MODERATE 35: CPT | Mod: SA,HB,, | Performed by: PSYCHIATRY & NEUROLOGY

## 2025-07-21 PROCEDURE — 82465 ASSAY BLD/SERUM CHOLESTEROL: CPT | Performed by: PSYCHIATRY & NEUROLOGY

## 2025-07-21 RX ORDER — QUETIAPINE FUMARATE 100 MG/1
200 TABLET, FILM COATED ORAL NIGHTLY
Status: DISCONTINUED | OUTPATIENT
Start: 2025-07-21 | End: 2025-07-22

## 2025-07-21 RX ORDER — DIAZEPAM 5 MG/1
10 TABLET ORAL 4 TIMES DAILY
Status: DISCONTINUED | OUTPATIENT
Start: 2025-07-21 | End: 2025-07-23

## 2025-07-21 RX ADMIN — DIAZEPAM 10 MG: 5 TABLET ORAL at 04:07

## 2025-07-21 RX ADMIN — AMLODIPINE BESYLATE 10 MG: 10 TABLET ORAL at 09:07

## 2025-07-21 RX ADMIN — Medication 400 MG: at 09:07

## 2025-07-21 RX ADMIN — QUETIAPINE FUMARATE 200 MG: 100 TABLET ORAL at 08:07

## 2025-07-21 RX ADMIN — DIAZEPAM 10 MG: 5 TABLET ORAL at 09:07

## 2025-07-21 RX ADMIN — THERA TABS 1 TABLET: TAB at 09:07

## 2025-07-21 RX ADMIN — Medication 100 MG: at 09:07

## 2025-07-21 RX ADMIN — FOLIC ACID 1 MG: 1 TABLET ORAL at 09:07

## 2025-07-21 RX ADMIN — POTASSIUM CHLORIDE 40 MEQ: 1500 TABLET, FILM COATED, EXTENDED RELEASE ORAL at 08:07

## 2025-07-21 RX ADMIN — SERTRALINE HYDROCHLORIDE 50 MG: 50 TABLET ORAL at 09:07

## 2025-07-21 RX ADMIN — ATORVASTATIN CALCIUM 20 MG: 20 TABLET, FILM COATED ORAL at 08:07

## 2025-07-21 RX ADMIN — Medication 400 MG: at 08:07

## 2025-07-21 RX ADMIN — DIAZEPAM 10 MG: 5 TABLET ORAL at 08:07

## 2025-07-21 RX ADMIN — POTASSIUM CHLORIDE 40 MEQ: 1500 TABLET, FILM COATED, EXTENDED RELEASE ORAL at 09:07

## 2025-07-21 RX ADMIN — LOSARTAN POTASSIUM 50 MG: 50 TABLET, FILM COATED ORAL at 09:07

## 2025-07-21 NOTE — PLAN OF CARE
Problem: Adult Behavioral Health Plan of Care  Goal: Rounds/Family Conference  Flowsheets (Taken 7/21/2025 1000)  Participants: (counselor)   psychiatrist   other (see comments)  Summary: Treatment team meeting     Pt refused to sign consent for collateral contact this morning. Pt's ETOH was elevated upon admission. Continuing medication regimen of Zoloft, Seroquel, and Valium. Will continue to monitor.

## 2025-07-21 NOTE — PLAN OF CARE
POC reviewed this shift and is ongoing.  Pt withdrawn to his room and comes out for vs, snacks, and meds. No adverse reactions. Pt denies SI,HI, A/V hallucinations. Pt had no neg behavior. Will continue to monitor for changes and safety.

## 2025-07-21 NOTE — CARE UPDATE
The patient announced that he was from the [facility], was very tired at this time and would prefer to skip the session this morning. He promised to read the brochure given to him concerning suicidal ideation and depression. He stated that he understands that he may be asked later on this morning to participate in either a session with the doctor or with the counselor for an initial assessment.     The patient was excused, due to his request, from the group psychotherapy session today.

## 2025-07-21 NOTE — PROGRESS NOTES
"PSYCHIATRY DAILY INPATIENT PROGRESS NOTE  SUBSEQUENT HOSPITAL VISIT    ENCOUNTER DATE: 7/21/2025  SITE: [facility]    DATE OF ADMISSION: 7/20/2025  8:04 AM  LENGTH OF STAY: 1 days      CHIEF COMPLAINT   Matthew Preston is a 59 y.o. male, seen during daily bernal rounds on the inpatient unit.  Matthew Preston presented with the chief complaint of Suicidal ideation, hallucinations      The patient was seen and examined. The chart was reviewed.     Reviewed notes from Rns and MD and labs from the last 24 hours.    The patient's case was discussed with the treatment team/care providers today including Rns and MD    Staff reports no behavioral or management issues.     The patient has been compliant with treatment.      Subjective 07/21/2025       Today the patient reports ongoing depression with suicidal ideation without plan. Reports poor sleep last night which he attributes to seroquel dose being too low, per patient "I take 400 mg at home." Patient endorses subjective s/s of ETOH withdrawal including anxiety, headache, nausea. Tremor noted to katt upper extremities.     Patient unsure if he is interested in rehab at this time.      The patient denies any side effects to medications.        Interim/overnight events per report/notes:          Psychiatric ROS (observed, reported, or endorsed/denied):  Depressed mood - Yes  Interest/pleasure/anhedonia: Yes  Guilt/hopelessness/worthlessness - Yes  Changes in Sleep - Yes  Changes in Appetite - No  Changes in Concentration - No  Changes in Energy - Yes  PMA/R- No  Suicidal- active/passive ideations - Yes  Homicidal ideations: active/passive ideations - No    Hallucinations - No  Delusions - No  Disorganized behavior - No  Disorganized speech - No  Negative symptoms - No    Elevated mood - No  Decreased need for sleep - No  Grandiosity - No  Racing thoughts - No  Impulsivity - No  Irritability- No  Increased energy - No  Distractibility - No  Increase in " goal-directed activity or PMA- No    Symptoms of KELI - No  Symptoms of Panic Disorder- No  Symptoms of PTSD - No        Overall progress: Patient is showing no improvement on the Unit to date        Psychotherapy:  Target symptoms: alcohol abuse, depression  Why chosen therapy is appropriate versus another modality: relevant to diagnosis, evidence based practice  Outcome monitoring methods: self-report, observation  Therapeutic intervention type: insight oriented psychotherapy, supportive psychotherapy  Topics discussed/themes: building skills sets for symptom management, symptom recognition, substance abuse  The patient's response to the intervention is accepting. The patient's progress toward treatment goals is fair.   Duration of intervention: 20 minutes.        Medical ROS  Review of Systems   Constitutional: Negative.    HENT: Negative.     Eyes: Negative.    Respiratory: Negative.     Cardiovascular: Negative.    Gastrointestinal:  Positive for nausea.   Genitourinary: Negative.    Musculoskeletal: Negative.    Skin: Negative.    Neurological:  Positive for tremors.   Endo/Heme/Allergies: Negative.    Psychiatric/Behavioral:  Positive for depression.          PAST MEDICAL HISTORY   Past Medical History:   Diagnosis Date    Depression     Diabetes mellitus     Hepatitis C antibody positive in blood 01/12/2025    RNA NEGATIVE    History of psychiatric care     History of psychiatric hospitalization     Multiple gastric ulcers     Psychiatric exam requested by authority     Psychiatric problem     Schizoaffective disorder, depressive type     Therapy            PSYCHOTROPIC MEDICATIONS   Scheduled Meds:   amLODIPine  10 mg Oral Daily    atorvastatin  20 mg Oral QHS    diazePAM  10 mg Oral TID    folic acid  1 mg Oral Daily    losartan  50 mg Oral Daily    magnesium oxide  400 mg Oral BID    multivitamin  1 tablet Oral Daily    potassium chloride  40 mEq Oral BID    QUEtiapine  100 mg Oral QHS    sertraline  50 mg  "Oral Daily    thiamine  100 mg Oral Daily     Continuous Infusions:  PRN Meds:.  Current Facility-Administered Medications:     acetaminophen, 650 mg, Oral, Q6H PRN    calcium carbonate, 1,000 mg, Oral, Q8H PRN    dextrose 50%, 12.5 g, Intravenous, PRN    dextrose 50%, 25 g, Intravenous, PRN    haloperidoL, 5 mg, Oral, Q4H PRN **AND** diphenhydrAMINE, 50 mg, Oral, Q4H PRN **AND** LORazepam, 2 mg, Oral, Q4H PRN **AND** haloperidol lactate, 5 mg, Intramuscular, Q4H PRN **AND** diphenhydrAMINE, 50 mg, Intramuscular, Q4H PRN **AND** lorazepam, 2 mg, Intramuscular, Q4H PRN    glucagon (human recombinant), 1 mg, Intramuscular, PRN    glucose, 16 g, Oral, PRN    glucose, 24 g, Oral, PRN    guaiFENesin 100 mg/5 ml, 200 mg, Oral, Q4H PRN    hydrOXYzine pamoate, 50 mg, Oral, Q6H PRN    insulin aspart U-100, 0-5 Units, Subcutaneous, QID (AC + HS) PRN    LORazepam, 2 mg, Oral, Q4H PRN    melatonin, 6 mg, Oral, Nightly PRN    nicotine, 1 patch, Transdermal, Daily PRN    ondansetron, 4 mg, Oral, Q8H PRN        EXAMINATION    VITALS   Vitals:    07/20/25 0813 07/20/25 1906 07/21/25 0725   BP: (!) 174/105 (!) 168/106 (!) 149/85   BP Location: Left arm Left arm Left arm   Patient Position: Sitting Sitting Sitting   Pulse: 70 76 78   Resp: 18 18 20   Temp: 97.8 °F (36.6 °C) 97.7 °F (36.5 °C) 97.5 °F (36.4 °C)   TempSrc: Oral Oral Oral   SpO2: 100% 97% 97%   Weight: 86.1 kg (189 lb 13.1 oz)     Height: 5' 9" (1.753 m)         Body mass index is 28.03 kg/m².        CONSTITUTIONAL  General Appearance: unremarkable, age appropriate    MUSCULOSKELETAL  Muscle Strength and Tone:tremor noted bilateral up ext  Abnormal Involuntary Movements: No  Gait and Station: not observed    PSYCHIATRIC   Level of Consciousness: awake, alert , and oriented  Orientation: person, place, time, and situation  Grooming: Casually dressed and Well groomed  Psychomotor Behavior: normal, cooperative, friendly and cooperative  Speech: normal tone, normal rate, " normal pitch, normal volume  Language: grossly intact  Mood: Depressed  Affect: Consistent with mood  Thought Process: linear, logical  Associations: intact   Thought Content: +SI, denies HI, and no delusions  Perceptions: denies AH, denies  VH, and not RIS  Memory: Able to recall past events, Remote intact, and Recent intact  Attention:Attends to interview without distraction  Fund of Knowledge: Aware of current events and Vocabulary appropriate   Estimate if Intelligence:  Average based on work/education history, vocabulary and mental status exam  Insight: has awareness of illness  Judgment: behavior is adequate to circumstances        DIAGNOSTIC TESTING   Laboratory Results  Recent Results (from the past 24 hours)   POCT glucose    Collection Time: 07/20/25  5:05 PM   Result Value Ref Range    POCT Glucose 108 70 - 110 mg/dL   POCT glucose    Collection Time: 07/20/25  7:59 PM   Result Value Ref Range    POCT Glucose 111 (H) 70 - 110 mg/dL   Lipid Panel    Collection Time: 07/21/25  6:03 AM   Result Value Ref Range    Cholesterol Total 88 (L) 120 - 199 mg/dL    Triglyceride 134 30 - 150 mg/dL    HDL Cholesterol 42 40 - 75 mg/dL    LDL Cholesterol 19.2 (L) 63.0 - 159.0 mg/dL    HDL/Cholesterol Ratio 47.7 20.0 - 50.0 %    Cholesterol/HDL Ratio 2.1 2.0 - 5.0    Non HDL Cholesterol 46 mg/dL   Hemoglobin A1c    Collection Time: 07/21/25  6:03 AM   Result Value Ref Range    Hemoglobin A1c 5.8 (H) 4.0 - 5.6 %    Estimated Average Glucose 120 68 - 131 mg/dL   POCT glucose    Collection Time: 07/21/25  6:16 AM   Result Value Ref Range    POCT Glucose 100 70 - 110 mg/dL             MEDICAL DECISION MAKING      ASSESSMENT:   Diagnosis:  MDD recurrent severe recurrent with psychotic features  KELI     Suicidal Ideation  Psychosocial Stressors     EtOH Dependence         PROBLEM LIST AND MANAGEMENT PLANS    Mood  - Start Zoloft 50mg PO daily with plan to titrate  - Seroquel for augmentation as below.      Psychosis  - Seroquel  100mg PO QHS with plan to titrate (previous dose 400mg HS)- Increase to 200 mg qhs     Anxiety  - Zoloft as above  - Vistaril PRN  - Valium taper as below     Psychosocial stressors  - Counseled     Suicidal Ideation  - Counseled     EtOH Dependence  - Valium 10mg PO TID with plan to taper as able-increase to QID  - CIWA, Vsq4h, WD precautions  - Ativan PRN  - Counseled            Discussed diagnosis, risks and benefits of proposed treatment vs alternative treatments vs no treatment, potential side effects of these treatments and the inherent unpredictability of treatment. The patient expresses understanding of the above and displays the capacity to agree with this treatment given said understanding. Patient also agrees that, currently, the benefits outweigh the risks and would like to pursue/continue treatment at this time.    Any medications being used off-label were discussed with the patient inclusive of the evidence base for the use of the medications and consent was obtained for the off-label use of the medication.       DISCHARGE PLANNING  Expected Disposition Plan: tbd      NEED FOR CONTINUED HOSPITALIZATION  Psychiatric illness continues to pose a potential threat to life or bodily function, of self or others, thereby requiring the need for continued inpatient psychiatric hospitalization: Yes, due to: suicidal ideation, as evidenced by:  Ongoing concerns with SI.    Protective inpatient pyschiatric hospitalization required while a safe disposition plan is enacted: Yes    Patient stabilized and ready for discharge from inpatient psychiatric unit: No        STAFF:   Ac Mane NP  Psychiatry

## 2025-07-21 NOTE — NURSING
Patient able to make needs known. No complaints of pain noted. Patient remains in his room. With drawn. Compliant with staff. Compliant with medication. Valium taper continued. Denies homicidal ideation. Patient continues to have Suicidal ideation. Denies auditory and visual hallucinations. Plan of care ongoing.

## 2025-07-22 LAB — POCT GLUCOSE: 117 MG/DL (ref 70–110)

## 2025-07-22 PROCEDURE — 25000003 PHARM REV CODE 250: Performed by: PSYCHIATRY & NEUROLOGY

## 2025-07-22 PROCEDURE — 99232 SBSQ HOSP IP/OBS MODERATE 35: CPT | Mod: SA,HB,, | Performed by: PSYCHIATRY & NEUROLOGY

## 2025-07-22 PROCEDURE — 11400000 HC PSYCH PRIVATE ROOM

## 2025-07-22 PROCEDURE — 25000003 PHARM REV CODE 250: Performed by: STUDENT IN AN ORGANIZED HEALTH CARE EDUCATION/TRAINING PROGRAM

## 2025-07-22 PROCEDURE — 90833 PSYTX W PT W E/M 30 MIN: CPT | Mod: SA,HB,, | Performed by: PSYCHIATRY & NEUROLOGY

## 2025-07-22 RX ORDER — QUETIAPINE FUMARATE 100 MG/1
300 TABLET, FILM COATED ORAL NIGHTLY
Status: DISCONTINUED | OUTPATIENT
Start: 2025-07-22 | End: 2025-07-24

## 2025-07-22 RX ADMIN — AMLODIPINE BESYLATE 10 MG: 10 TABLET ORAL at 09:07

## 2025-07-22 RX ADMIN — THERA TABS 1 TABLET: TAB at 09:07

## 2025-07-22 RX ADMIN — SERTRALINE HYDROCHLORIDE 50 MG: 50 TABLET ORAL at 09:07

## 2025-07-22 RX ADMIN — Medication 100 MG: at 09:07

## 2025-07-22 RX ADMIN — ATORVASTATIN CALCIUM 20 MG: 20 TABLET, FILM COATED ORAL at 08:07

## 2025-07-22 RX ADMIN — DIAZEPAM 10 MG: 5 TABLET ORAL at 08:07

## 2025-07-22 RX ADMIN — QUETIAPINE FUMARATE 300 MG: 100 TABLET ORAL at 08:07

## 2025-07-22 RX ADMIN — LOSARTAN POTASSIUM 50 MG: 50 TABLET, FILM COATED ORAL at 09:07

## 2025-07-22 RX ADMIN — DIAZEPAM 10 MG: 5 TABLET ORAL at 09:07

## 2025-07-22 RX ADMIN — DIAZEPAM 10 MG: 5 TABLET ORAL at 05:07

## 2025-07-22 RX ADMIN — FOLIC ACID 1 MG: 1 TABLET ORAL at 09:07

## 2025-07-22 NOTE — PLAN OF CARE
"Behavioral Health Unit  Psychosocial History and Assessment  Progress Note      Patient Name: Matthew Preston YOB: 1965 SW: Karma Pandey LPC Date: 7/22/2025    Chief Complaint: suicidal ideation    Consent:     Did the patient consent for an interview with the ? Yes    Did the patient consent for the  to contact family/friend/caregiver? No    Did the patient give consent for the  to inform family/friend/caregiver of his/her whereabouts or to discuss discharge planning? No    Source of Information: Face to face with patient    Is information obtained from interviews considered reliable? N\A    Reason for Admission:     Active Hospital Problems    Diagnosis  POA    *Medical clearance for psychiatric admission [Z00.8]  Not Applicable    Primary hypertension [I10]  Yes    Suicidal ideation [R45.851]  Not Applicable    Type 2 diabetes mellitus without complication, without long-term current use of insulin [E11.9]  Yes      Resolved Hospital Problems   No resolved problems to display.       History of Present Illness - (Patient Perception): "Depression, stuff with my children." Pt was a poor historian and did not want to clarify or speak further on this topic. Pt was difficult to understand and became frustrated with the amount of personal questions being asked.     History of Present Illness - (Perception of Others): Unable to assess at this time.     Present biopsychosocial functioning: Per MD, patient is a is a 59 y.o. male patient with a PMHx of depression, psychiatric hospitalization, schizoaffective disorder, DM, and gastric ulcers who presents to the Emergency Department for psychiatric evaluation. Pt's ETOH is elevated. Pt's UDS is negative. Pt denies self-harm, SI/HI/AH/VH. Pt is single and living in a group home. Pt is independent with all ADLs. Pt is disabled and receives SSI. Pt denies current outpatient treatment. Pt reports current stressor as " "depression and death of his daughter.    Past biopsychosocial functioning: Pt reports previous inpatient treatment but denies previous outpatient treatment.     Family and Marital/Relationship History:     Significant Other/Partner Relationships: Single:      Family Relationships: Intact    Childhood History:     Where was patient raised? [place]    Who raised the patient? Biological parents    How does patient describe their childhood? Good.    Who is patient's primary support person? "I got a bunch of them."    Culture and Islam:     Islam: Other    How strong of a role does Christianity and spirituality play in patient's life? Not a big one    Buddhist or spiritual concerns regarding treatment: not applicable     History of Abuse:     History of Abuse: Denies      Outcome: Denies    Psychiatric and Medical History:     History of psychiatric illness or treatment: prior inpatient treatment, psychotropic management by PCP, and prior suicide attempt(s)    Medical history:   Past Medical History:   Diagnosis Date    Depression     Diabetes mellitus     Hepatitis C antibody positive in blood 01/12/2025    RNA NEGATIVE    History of psychiatric care     History of psychiatric hospitalization     Multiple gastric ulcers     Psychiatric exam requested by authority     Psychiatric problem     Schizoaffective disorder, depressive type     Therapy        Substance Abuse History:     Alcohol - (Patient Perspective):   Social History     Substance and Sexual Activity   Alcohol Use Yes    Comment: Patient stated that he have "too much"       Alcohol - (Collateral Perspective): Unable to assess at this time.     Drugs - (Patient Perspective):   Social History     Substance and Sexual Activity   Drug Use Not Currently       Drugs - (Collateral Perspective): Unable to assess at this time.     Additional Comments: Pt's ETOH is elevated. Pt's UDS is negative.    Education:     Currently Enrolled? No  High School (9-12) " or GED    Special Education? No    Interested in Completing Education/GED: No    Employment and Financial:     Currently employed? Disability    Source of Income: SSI    Able to afford basic needs (food, shelter, utilities)? Yes    Who manages finances/personal affairs? self      Service:     ? no    Combat Service? No     Community Resources:     Describe present use of community resources: Pt denies current outpatient treatment.      Identify previously used community resources   (Include previous mental health treatment - outpatient and inpatient): Pt reports previous inpatient treatment but denies previous outpatient treatment.     Environmental:     Current living situation: Group home    Social Evaluation:     Patient Assets: Other: N/A    Patient Limitations: history of unsuccessful treatment, housing insecurity, lacks insight into illness, limited social skills, limited support system, occupational insecurity, poor physical health, recent loss, substance abuse/addiction, lacks coping skills, not motivated for treatment or recovery    High risk psychosocial issues that may impact discharge planning: Substance Abuse    Recommendations:     Anticipated discharge plan: outpatient follow up, group home    High risk issues requiring early treatment planning and immediate intervention: depression & SI    Community resources needed for discharge planning: aftercare treatment sources    Anticipated social work role(s) in treatment and discharge planning: LPC will facilitate process groups to assist in developing healthy coping skills. CM will arrange outpatient follow-up appointments and assist with discharge planning.

## 2025-07-22 NOTE — NURSING
Pt calm and cooperative with staff. Med complaint but refuses at times. Stayed in room most of the time except for meals and snack times. Did not attend group but spoke with counselor briefly.

## 2025-07-22 NOTE — CARE UPDATE
The patient, when approached, denied any depression or suicidal ideation when asked about it. The patient exhibited very depressed mood with inappropriate affect as evidenced by the inconsistency between what he said and his body language. When this also pointed out to him, he mentioned that he is not willing at this to have an extended conversation about it but may be open for a discussion in the evening or the next day.

## 2025-07-22 NOTE — PROGRESS NOTES
"PSYCHIATRY DAILY INPATIENT PROGRESS NOTE  SUBSEQUENT HOSPITAL VISIT    ENCOUNTER DATE: 7/22/2025  SITE: [facility]    DATE OF ADMISSION: 7/20/2025  8:04 AM  LENGTH OF STAY: 2 days      CHIEF COMPLAINT   Matthew rPeston is a 59 y.o. male, seen during daily bernal rounds on the inpatient unit.  Matthew Preston presented with the chief complaint of Suicidal ideation, hallucinations      The patient was seen and examined. The chart was reviewed.     Reviewed notes from Rns and MD and labs from the last 24 hours.    The patient's case was discussed with the treatment team/care providers today including Rns and MD    Staff reports no behavioral or management issues.     The patient has been compliant with treatment.      Subjective 07/22/2025       Patient reports mood is "better" compared to yesterday. Continues to endorse depressed mood but denies suicidal ideation. Reports withdrawal symptoms are now better controlled since valium dose was increased yesterday. He appears in no distress, tremor resolved.     Patient states today that he does not want to attend inpatient rehab when discharged from the hospital but may consider outpatient.     Patient denies SE to current medication regimen.     Symptoms persist and remain impairing requiring further hospitalization for symptom management and medication adjustment.    Interim/overnight events per report/notes:          Psychiatric ROS (observed, reported, or endorsed/denied):  Depressed mood - Yes  Interest/pleasure/anhedonia: Yes  Guilt/hopelessness/worthlessness - Yes  Changes in Sleep - Yes  Changes in Appetite - No  Changes in Concentration - No  Changes in Energy - Yes  PMA/R- No  Suicidal- active/passive ideations - less  Homicidal ideations: active/passive ideations - No    Hallucinations - No  Delusions - No  Disorganized behavior - No  Disorganized speech - No  Negative symptoms - No    Elevated mood - No  Decreased need for sleep - No  Grandiosity - " No  Racing thoughts - No  Impulsivity - No  Irritability- No  Increased energy - No  Distractibility - No  Increase in goal-directed activity or PMA- No    Symptoms of KELI - No  Symptoms of Panic Disorder- No  Symptoms of PTSD - No        Overall progress: Patient is showing mild improvement         Psychotherapy:  Target symptoms: alcohol abuse, depression  Why chosen therapy is appropriate versus another modality: relevant to diagnosis, evidence based practice  Outcome monitoring methods: self-report, observation  Therapeutic intervention type: insight oriented psychotherapy, supportive psychotherapy  Topics discussed/themes: building skills sets for symptom management, symptom recognition, substance abuse  The patient's response to the intervention is accepting. The patient's progress toward treatment goals is fair.   Duration of intervention: 20 minutes.        Medical ROS  Review of Systems   Constitutional: Negative.    HENT: Negative.     Eyes: Negative.    Respiratory: Negative.     Cardiovascular: Negative.    Gastrointestinal:  Positive for nausea.   Genitourinary: Negative.    Musculoskeletal: Negative.    Skin: Negative.    Neurological:  Positive for tremors.   Endo/Heme/Allergies: Negative.    Psychiatric/Behavioral:  Positive for depression.          PAST MEDICAL HISTORY   Past Medical History:   Diagnosis Date    Depression     Diabetes mellitus     Hepatitis C antibody positive in blood 01/12/2025    RNA NEGATIVE    History of psychiatric care     History of psychiatric hospitalization     Multiple gastric ulcers     Psychiatric exam requested by authority     Psychiatric problem     Schizoaffective disorder, depressive type     Therapy            PSYCHOTROPIC MEDICATIONS   Scheduled Meds:   amLODIPine  10 mg Oral Daily    atorvastatin  20 mg Oral QHS    diazePAM  10 mg Oral QID    folic acid  1 mg Oral Daily    losartan  50 mg Oral Daily    multivitamin  1 tablet Oral Daily    QUEtiapine  200 mg  Oral QHS    sertraline  50 mg Oral Daily    thiamine  100 mg Oral Daily     Continuous Infusions:  PRN Meds:.  Current Facility-Administered Medications:     acetaminophen, 650 mg, Oral, Q6H PRN    calcium carbonate, 1,000 mg, Oral, Q8H PRN    haloperidoL, 5 mg, Oral, Q4H PRN **AND** diphenhydrAMINE, 50 mg, Oral, Q4H PRN **AND** LORazepam, 2 mg, Oral, Q4H PRN **AND** haloperidol lactate, 5 mg, Intramuscular, Q4H PRN **AND** diphenhydrAMINE, 50 mg, Intramuscular, Q4H PRN **AND** lorazepam, 2 mg, Intramuscular, Q4H PRN    guaiFENesin 100 mg/5 ml, 200 mg, Oral, Q4H PRN    hydrOXYzine pamoate, 50 mg, Oral, Q6H PRN    LORazepam, 2 mg, Oral, Q4H PRN    melatonin, 6 mg, Oral, Nightly PRN    nicotine, 1 patch, Transdermal, Daily PRN    ondansetron, 4 mg, Oral, Q8H PRN        EXAMINATION    VITALS   Vitals:    07/20/25 1906 07/21/25 0725 07/21/25 1901 07/22/25 0719   BP: (!) 168/106 (!) 149/85 (!) 140/80 129/78   BP Location: Left arm Left arm Left arm Left arm   Patient Position: Sitting Sitting Sitting Sitting   Pulse: 76 78 84 84   Resp: 18 20 20 18   Temp: 97.7 °F (36.5 °C) 97.5 °F (36.4 °C) 98.1 °F (36.7 °C) 97.7 °F (36.5 °C)   TempSrc: Oral Oral Oral Oral   SpO2: 97% 97% 97% 98%   Weight:       Height:           Body mass index is 28.03 kg/m².        CONSTITUTIONAL  General Appearance: unremarkable, age appropriate    MUSCULOSKELETAL  Muscle Strength and Tone:tremor noted bilateral up ext  Abnormal Involuntary Movements: No  Gait and Station: not observed    PSYCHIATRIC   Level of Consciousness: awake, alert , and oriented  Orientation: person, place, time, and situation  Grooming: Casually dressed and Well groomed  Psychomotor Behavior: normal, cooperative, friendly and cooperative  Speech: normal tone, normal rate, normal pitch, normal volume  Language: grossly intact  Mood: Depressed  Affect: Consistent with mood  Thought Process: linear, logical  Associations: intact   Thought Content: less SI, denies HI, and no  delusions  Perceptions: denies AH, denies  VH, and not RIS  Memory: Able to recall past events, Remote intact, and Recent intact  Attention:Attends to interview without distraction  Fund of Knowledge: Aware of current events and Vocabulary appropriate   Estimate if Intelligence:  Average based on work/education history, vocabulary and mental status exam  Insight: has awareness of illness  Judgment: behavior is adequate to circumstances        DIAGNOSTIC TESTING   Laboratory Results  Recent Results (from the past 24 hours)   POCT glucose    Collection Time: 07/21/25 11:25 AM   Result Value Ref Range    POCT Glucose 145 (H) 70 - 110 mg/dL   POCT glucose    Collection Time: 07/21/25  8:00 PM   Result Value Ref Range    POCT Glucose 117 (H) 70 - 110 mg/dL   POCT glucose    Collection Time: 07/22/25  6:04 AM   Result Value Ref Range    POCT Glucose 117 (H) 70 - 110 mg/dL             MEDICAL DECISION MAKING      ASSESSMENT:   Diagnosis:  MDD recurrent severe recurrent with psychotic features  KELI     Suicidal Ideation  Psychosocial Stressors     EtOH Dependence         PROBLEM LIST AND MANAGEMENT PLANS    Mood  - Start Zoloft 50mg PO daily with plan to titrate- Increase to 75 mg tomorrow  - Seroquel for augmentation as below.      Psychosis  - Seroquel 100mg PO QHS with plan to titrate (previous dose 400mg HS)- Increase to 200 mg qhs   -Increase to 300 mg qhs  Anxiety  - Zoloft as above  - Vistaril PRN  - Valium taper as below     Psychosocial stressors  - Counseled     Suicidal Ideation  - Counseled     EtOH Dependence  - Valium 10mg PO TID with plan to taper as able-increase to QID  - CIWA, Vsq4h, WD precautions  - Ativan PRN  - Counseled            Discussed diagnosis, risks and benefits of proposed treatment vs alternative treatments vs no treatment, potential side effects of these treatments and the inherent unpredictability of treatment. The patient expresses understanding of the above and displays the capacity to  agree with this treatment given said understanding. Patient also agrees that, currently, the benefits outweigh the risks and would like to pursue/continue treatment at this time.    Any medications being used off-label were discussed with the patient inclusive of the evidence base for the use of the medications and consent was obtained for the off-label use of the medication.       DISCHARGE PLANNING  Expected Disposition Plan: tbd      NEED FOR CONTINUED HOSPITALIZATION  Psychiatric illness continues to pose a potential threat to life or bodily function, of self or others, thereby requiring the need for continued inpatient psychiatric hospitalization: Yes, due to: suicidal ideation, as evidenced by:  Ongoing concerns with SI.    Protective inpatient pyschiatric hospitalization required while a safe disposition plan is enacted: Yes    Patient stabilized and ready for discharge from inpatient psychiatric unit: No        STAFF:   Ac Mane NP  Psychiatry

## 2025-07-22 NOTE — PLAN OF CARE
"Collateral:   Ms. Samuels, [employer], [phone]    Collateral Perception of Problem:   "Its difficult to deal with him. He has a very toxic family relationship. When he's with me he's stable. We have a bed if he wants to come back but he has a drinking problem. He's been through rehab a few times. Usually med-noncompliant. He's more in the upper middle category of functioning, but usually winds up living with someone on the lower end and winds up triggered and starts drinking again. The anxiety wakes up and the cycle begins again. He's been staying with a lady, Ms. Altamirano. She gives him a break on his rent because he helps with responsibilities but he is not stable enough to take on others' responsibilities. If he would just take care of himself, he would be fine. Its a toxic environment that triggers his manic behavior and he winds up in the hospital yet again."    Previous Psych History/Hospitalizations:  "Quite a few times. This happens often."    Suicide Attempts (how/severity):  "I think he's had thoughts but Junie never seen him with a plan in effect. He'll make comments but when you ask him about it, he just say he don't know."    How long has pt had problems (childhood dx?):  "I got him 5 years ago but its been longer than that. 7-10 years maybe?"    Impulse issues:  "When he gets into his manic phase, yes. But he's fine when he's on the medicine."    History of violence:   "Yes, he's been arrested a couple of times for fighting. Especially with his family in [place]. When he has his drunken episodes, it leads to fights."    Drug Use:  "No."    Alcohol Use:  "He's a mean drunk. A black out drunk. He becomes agitated and wants to fight anyone. He's been to rehab a few times but always goes right back to drinking."    Legal Issues:  "I think he's cleared up all the legal issues."    Other Pertinent Info:   "Give him more emphasis on being med compliant, being in a stable environment, not " "mixing meds and alcohol."    Baseline:  "He's calm, independent with ADLs, follows commands, pretty much takes care of himself."    Discharge Plan:  [id number] [place] [id number]    "

## 2025-07-22 NOTE — PLAN OF CARE
Problem: Adult Behavioral Health Plan of Care  Goal: Plan of Care Review  Outcome: Progressing  Goal: Patient-Specific Goal (Individualization)  Outcome: Progressing  Goal: Adheres to Safety Considerations for Self and Others  Outcome: Progressing  Goal: Absence of New-Onset Illness or Injury  Outcome: Progressing  Goal: Optimized Coping Skills in Response to Life Stressors  Outcome: Progressing     Problem: Diabetes Comorbidity  Goal: Blood Glucose Level Within Targeted Range  Outcome: Progressing     Problem: Suicide Risk  Goal: Absence of Self-Harm  Outcome: Progressing     Problem: Fall Injury Risk  Goal: Absence of Fall and Fall-Related Injury  Outcome: Progressing     Problem: Excessive Substance Use  Goal: Optimized Energy Level (Excessive Substance Use)  Outcome: Progressing  Goal: Improved Behavioral Control (Excessive Substance Use)  Outcome: Progressing  Goal: Increased Participation and Engagement (Excessive Substance Use)  Outcome: Progressing  Goal: Improved Physiologic Symptoms (Excessive Substance Use)  Outcome: Progressing  Goal: Enhanced Social, Occupational or Functional Skills (Excessive Substance Use)  Outcome: Progressing     Problem: Depressive Signs/Symptoms  Goal: Optimized Energy Level (Depressive Signs/Symptoms)  Outcome: Progressing  Goal: Optimized Cognitive Function (Depressive Signs/Symptoms)  Outcome: Progressing  Goal: Increased Participation and Engagement (Depressive Signs/Symptoms)  Outcome: Progressing  Goal: Enhanced Self-Esteem and Confidence (Depressive Signs/Symptoms)  Outcome: Progressing  Goal: Improved Mood Symptoms (Depressive Signs/Symptoms)  Outcome: Progressing  Goal: Optimized Nutrition Intake (Depressive Signs/Symptoms)  Outcome: Progressing  Goal: Improved Psychomotor Symptoms (Depressive Signs/Symptoms)  Outcome: Progressing  Goal: Improved Sleep (Depressive Signs/Symptoms)  Outcome: Progressing  Goal: Enhanced Social, Occupational or Functional Skills  (Depressive Signs/Symptoms)  Outcome: Progressing

## 2025-07-22 NOTE — NURSING
Pt is calm and cooperative with staff, med complaint. ACHS continue, no coverage needed. Pt request to not be stuck as much, Pt was informed to speak to the dr in the am due to not needing any coverage this hospital stay that maybe we can just check him once or twice a day. Pt verbalized understanding. Pt out of room in common areas watching TV with peers. Pt ate snacks and second plate then returned to room. No ss of distress noted. Will continue to monitor.

## 2025-07-22 NOTE — PLAN OF CARE
Problem: Adult Behavioral Health Plan of Care  Goal: Plan of Care Review  Outcome: Progressing  Goal: Patient-Specific Goal (Individualization)  Outcome: Progressing  Goal: Adheres to Safety Considerations for Self and Others  Outcome: Progressing  Goal: Absence of New-Onset Illness or Injury  Outcome: Progressing  Goal: Optimized Coping Skills in Response to Life Stressors  Outcome: Progressing  Goal: Develops/Participates in Therapeutic [place] to Support Successful Transition  Outcome: Progressing  Goal: Rounds/Family Conference  Outcome: Progressing     Problem: Diabetes Comorbidity  Goal: Blood Glucose Level Within Targeted Range  Outcome: Progressing     Problem: Suicide Risk  Goal: Absence of Self-Harm  Outcome: Progressing     Problem: Fall Injury Risk  Goal: Absence of Fall and Fall-Related Injury  Outcome: Progressing     Problem: Excessive Substance Use  Goal: Optimized Energy Level (Excessive Substance Use)  Outcome: Progressing  Goal: Improved Behavioral Control (Excessive Substance Use)  Outcome: Progressing  Goal: Increased Participation and Engagement (Excessive Substance Use)  Outcome: Progressing  Goal: Improved Physiologic Symptoms (Excessive Substance Use)  Outcome: Progressing  Goal: Enhanced Social, Occupational or Functional Skills (Excessive Substance Use)  Outcome: Progressing     Problem: Depressive Signs/Symptoms  Goal: Optimized Energy Level (Depressive Signs/Symptoms)  Outcome: Progressing  Goal: Optimized Cognitive Function (Depressive Signs/Symptoms)  Outcome: Progressing  Goal: Increased Participation and Engagement (Depressive Signs/Symptoms)  Outcome: Progressing  Goal: Enhanced Self-Esteem and Confidence (Depressive Signs/Symptoms)  Outcome: Progressing  Goal: Improved Mood Symptoms (Depressive Signs/Symptoms)  Outcome: Progressing  Goal: Optimized Nutrition Intake (Depressive Signs/Symptoms)  Outcome: Progressing  Goal: Improved Psychomotor Symptoms (Depressive  Signs/Symptoms)  Outcome: Progressing  Goal: Improved Sleep (Depressive Signs/Symptoms)  Outcome: Progressing  Goal: Enhanced Social, Occupational or Functional Skills (Depressive Signs/Symptoms)  Outcome: Progressing

## 2025-07-23 PROCEDURE — 90833 PSYTX W PT W E/M 30 MIN: CPT | Mod: SA,HB,, | Performed by: PSYCHIATRY & NEUROLOGY

## 2025-07-23 PROCEDURE — 25000003 PHARM REV CODE 250: Performed by: PSYCHIATRY & NEUROLOGY

## 2025-07-23 PROCEDURE — 99232 SBSQ HOSP IP/OBS MODERATE 35: CPT | Mod: SA,HB,, | Performed by: PSYCHIATRY & NEUROLOGY

## 2025-07-23 PROCEDURE — 11400000 HC PSYCH PRIVATE ROOM

## 2025-07-23 PROCEDURE — 25000003 PHARM REV CODE 250: Performed by: STUDENT IN AN ORGANIZED HEALTH CARE EDUCATION/TRAINING PROGRAM

## 2025-07-23 RX ORDER — DIAZEPAM 5 MG/1
10 TABLET ORAL 3 TIMES DAILY
Status: DISCONTINUED | OUTPATIENT
Start: 2025-07-23 | End: 2025-07-24

## 2025-07-23 RX ORDER — NALTREXONE HYDROCHLORIDE 50 MG/1
50 TABLET, FILM COATED ORAL DAILY
Status: DISCONTINUED | OUTPATIENT
Start: 2025-07-23 | End: 2025-07-26 | Stop reason: HOSPADM

## 2025-07-23 RX ADMIN — ATORVASTATIN CALCIUM 20 MG: 20 TABLET, FILM COATED ORAL at 08:07

## 2025-07-23 RX ADMIN — THERA TABS 1 TABLET: TAB at 08:07

## 2025-07-23 RX ADMIN — Medication 100 MG: at 08:07

## 2025-07-23 RX ADMIN — QUETIAPINE FUMARATE 300 MG: 100 TABLET ORAL at 08:07

## 2025-07-23 RX ADMIN — LOSARTAN POTASSIUM 50 MG: 50 TABLET, FILM COATED ORAL at 08:07

## 2025-07-23 RX ADMIN — DIAZEPAM 10 MG: 5 TABLET ORAL at 08:07

## 2025-07-23 RX ADMIN — NALTREXONE HYDROCHLORIDE 50 MG: 50 TABLET, FILM COATED ORAL at 01:07

## 2025-07-23 RX ADMIN — SERTRALINE HYDROCHLORIDE 75 MG: 50 TABLET ORAL at 08:07

## 2025-07-23 RX ADMIN — DIAZEPAM 10 MG: 5 TABLET ORAL at 01:07

## 2025-07-23 RX ADMIN — AMLODIPINE BESYLATE 10 MG: 10 TABLET ORAL at 08:07

## 2025-07-23 RX ADMIN — FOLIC ACID 1 MG: 1 TABLET ORAL at 08:07

## 2025-07-23 NOTE — NURSING
Patient calm, cooperative with no negative behaviors noted. Appetite is good and is medication compliant. Patient appears depressed, denies anxiety, denies S/HI, denies A/VH. Patient is on Diazepam detox protocol for ETOH abuse and denies withdrawal symptoms. Ac Mane NP visited with new order for Naltrexone with first dose being given today. Appetite is good for meals and is medication compliant. Patient ambulates halls frequently. Patient is watching tv at this time in common area. Close observations continued and safe environment maintained.

## 2025-07-23 NOTE — PROGRESS NOTES
"PSYCHIATRY DAILY INPATIENT PROGRESS NOTE  SUBSEQUENT HOSPITAL VISIT    ENCOUNTER DATE: 7/23/2025  SITE: [facility]    DATE OF ADMISSION: 7/20/2025  8:04 AM  LENGTH OF STAY: 3 days      CHIEF COMPLAINT   Matthew Preston is a 59 y.o. male, seen during daily bernal rounds on the inpatient unit.  Matthew Preston presented with the chief complaint of Suicidal ideation, hallucinations      The patient was seen and examined. The chart was reviewed.     Reviewed notes from Rns and MD and labs from the last 24 hours.    The patient's case was discussed with the treatment team/care providers today including Rns and MD    Staff reports no behavioral or management issues.     The patient has been compliant with treatment.      Subjective 07/23/2025       Patient reports mood is "better." Today he states "I don't know what I told them in the ER, I was drunk, you know how you can say some crazy things." He denies suicidal ideation today. Patient states that he is ready to discharge back to group [facility] in [place] and adds that group home facilitates transportation to and from AA meetings several times per week which he has been attending.     Patient observed socializing with peers in day room.     Patient disclosed today that he "only drinks on the weekend"- However he may not be a reliable narrator and is still considered mild to moderate risk of alcohol withdrawal.         Psychiatric ROS (observed, reported, or endorsed/denied):  Depressed mood - No  Interest/pleasure/anhedonia: No  Guilt/hopelessness/worthlessness - No  Changes in Sleep - No  Changes in Appetite - No  Changes in Concentration - No  Changes in Energy - less  PMA/R- No  Suicidal- active/passive ideations - less  Homicidal ideations: active/passive ideations - No    Hallucinations - No  Delusions - No  Disorganized behavior - No  Disorganized speech - No  Negative symptoms - No    Elevated mood - No  Decreased need for sleep - No  Grandiosity - " No  Racing thoughts - No  Impulsivity - No  Irritability- No  Increased energy - No  Distractibility - No  Increase in goal-directed activity or PMA- No    Symptoms of KELI - No  Symptoms of Panic Disorder- No  Symptoms of PTSD - No        Overall progress: Patient is showing mild improvement         Psychotherapy:  Target symptoms: alcohol abuse, depression  Why chosen therapy is appropriate versus another modality: relevant to diagnosis, evidence based practice  Outcome monitoring methods: self-report, observation  Therapeutic intervention type: insight oriented psychotherapy, supportive psychotherapy  Topics discussed/themes: building skills sets for symptom management, symptom recognition, substance abuse  The patient's response to the intervention is accepting. The patient's progress toward treatment goals is fair.   Duration of intervention: 20 minutes.        Medical ROS  Review of Systems   Constitutional: Negative.    HENT: Negative.     Eyes: Negative.    Respiratory: Negative.     Cardiovascular: Negative.    Gastrointestinal:  Positive for nausea.   Genitourinary: Negative.    Musculoskeletal: Negative.    Skin: Negative.    Neurological:  Positive for tremors.   Endo/Heme/Allergies: Negative.    Psychiatric/Behavioral:  Positive for depression.          PAST MEDICAL HISTORY   Past Medical History:   Diagnosis Date    Depression     Diabetes mellitus     Hepatitis C antibody positive in blood 01/12/2025    RNA NEGATIVE    History of psychiatric care     History of psychiatric hospitalization     Multiple gastric ulcers     Psychiatric exam requested by authority     Psychiatric problem     Schizoaffective disorder, depressive type     Therapy            PSYCHOTROPIC MEDICATIONS   Scheduled Meds:   amLODIPine  10 mg Oral Daily    atorvastatin  20 mg Oral QHS    diazePAM  10 mg Oral QID    folic acid  1 mg Oral Daily    losartan  50 mg Oral Daily    multivitamin  1 tablet Oral Daily    naltrexone  50 mg  "Oral Daily    QUEtiapine  300 mg Oral QHS    sertraline  75 mg Oral Daily    thiamine  100 mg Oral Daily     Continuous Infusions:  PRN Meds:.  Current Facility-Administered Medications:     acetaminophen, 650 mg, Oral, Q6H PRN    calcium carbonate, 1,000 mg, Oral, Q8H PRN    haloperidoL, 5 mg, Oral, Q4H PRN **AND** diphenhydrAMINE, 50 mg, Oral, Q4H PRN **AND** LORazepam, 2 mg, Oral, Q4H PRN **AND** haloperidol lactate, 5 mg, Intramuscular, Q4H PRN **AND** diphenhydrAMINE, 50 mg, Intramuscular, Q4H PRN **AND** lorazepam, 2 mg, Intramuscular, Q4H PRN    guaiFENesin 100 mg/5 ml, 200 mg, Oral, Q4H PRN    hydrOXYzine pamoate, 50 mg, Oral, Q6H PRN    LORazepam, 2 mg, Oral, Q4H PRN    melatonin, 6 mg, Oral, Nightly PRN    nicotine, 1 patch, Transdermal, Daily PRN    ondansetron, 4 mg, Oral, Q8H PRN        EXAMINATION    VITALS   Vitals:    07/22/25 0719 07/22/25 1924 07/23/25 0709 07/23/25 0800   BP: 129/78 130/75 124/72 124/72   BP Location: Left arm Left arm Left arm Left arm   Patient Position: Sitting Sitting Sitting Sitting   Pulse: 84 91 89 89   Resp: 18 18 20 20   Temp: 97.7 °F (36.5 °C) 97.5 °F (36.4 °C) 97.9 °F (36.6 °C) 97.9 °F (36.6 °C)   TempSrc: Oral Oral Oral Oral   SpO2: 98% 95% 97% 97%   Weight:       Height:           Body mass index is 28.03 kg/m².        CONSTITUTIONAL  General Appearance: unremarkable, age appropriate    MUSCULOSKELETAL  Muscle Strength and Tone:no tremor, no tic  Abnormal Involuntary Movements: No  Gait and Station: not observed    PSYCHIATRIC   Level of Consciousness: awake, alert , and oriented  Orientation: person, place, time, and situation  Grooming: Casually dressed and Well groomed  Psychomotor Behavior: normal, cooperative, friendly and cooperative  Speech: normal tone, normal rate, normal pitch, normal volume  Language: grossly intact  Mood: "Good"  Affect: Consistent with mood  Thought Process: linear, logical  Associations: intact   Thought Content: less SI, denies HI, and " no delusions  Perceptions: denies AH, denies  VH, and not RIS  Memory: Able to recall past events, Remote intact, and Recent intact  Attention:Attends to interview without distraction  Fund of Knowledge: Aware of current events and Vocabulary appropriate   Estimate if Intelligence:  Average based on work/education history, vocabulary and mental status exam  Insight: has awareness of illness  Judgment: behavior is adequate to circumstances        DIAGNOSTIC TESTING   Laboratory Results  No results found for this or any previous visit (from the past 24 hours).            MEDICAL DECISION MAKING      ASSESSMENT:   Diagnosis:  MDD recurrent severe recurrent with psychotic features  KELI     Suicidal Ideation  Psychosocial Stressors     EtOH Dependence         PROBLEM LIST AND MANAGEMENT PLANS    Mood  - Start Zoloft 50mg PO daily with plan to titrate- Increase to 75 mg tomorrow  - Seroquel for augmentation as below.      Psychosis  - Seroquel 100mg PO QHS with plan to titrate (previous dose 400mg HS)- Increase to 200 mg qhs   -Increase to 300 mg qhs  Anxiety  - Zoloft as above  - Vistaril PRN  - Valium taper as below     Psychosocial stressors  - Counseled     Suicidal Ideation  - Counseled     EtOH Dependence  - Valium 10mg PO TID with plan to taper as able-increase to QID  - CIWA, Vsq4h, WD precautions  - Ativan PRN  - Counseled  -Initiate naltrexone po 50 mg/day          Discussed diagnosis, risks and benefits of proposed treatment vs alternative treatments vs no treatment, potential side effects of these treatments and the inherent unpredictability of treatment. The patient expresses understanding of the above and displays the capacity to agree with this treatment given said understanding. Patient also agrees that, currently, the benefits outweigh the risks and would like to pursue/continue treatment at this time.    Any medications being used off-label were discussed with the patient inclusive of the evidence base  for the use of the medications and consent was obtained for the off-label use of the medication.       DISCHARGE PLANNING  Expected Disposition Plan: tbd      NEED FOR CONTINUED HOSPITALIZATION  Psychiatric illness continues to pose a potential threat to life or bodily function, of self or others, thereby requiring the need for continued inpatient psychiatric hospitalization: Yes, due to: suicidal ideation, as evidenced by:  Ongoing concerns with SI.    Protective inpatient pyschiatric hospitalization required while a safe disposition plan is enacted: Yes    Patient stabilized and ready for discharge from inpatient psychiatric unit: No        STAFF:   Ac Mane NP  Psychiatry

## 2025-07-23 NOTE — PLAN OF CARE
Problem: Adult Behavioral Health Plan of Care  Goal: Plan of Care Review  Outcome: Progressing     Problem: Adult Behavioral Health Plan of Care  Goal: Patient-Specific Goal (Individualization)  Outcome: Progressing     Problem: Adult Behavioral Health Plan of Care  Goal: Adheres to Safety Considerations for Self and Others  Outcome: Progressing     Problem: Suicide Risk  Goal: Absence of Self-Harm  Outcome: Progressing     Problem: Excessive Substance Use  Goal: Optimized Energy Level (Excessive Substance Use)  Outcome: Progressing     Problem: Excessive Substance Use  Goal: Improved Behavioral Control (Excessive Substance Use)  Outcome: Progressing     Problem: Excessive Substance Use  Goal: Improved Physiologic Symptoms (Excessive Substance Use)  Outcome: Progressing     Problem: Depressive Signs/Symptoms  Goal: Optimized Energy Level (Depressive Signs/Symptoms)  Outcome: Progressing     Problem: Depressive Signs/Symptoms  Goal: Improved Mood Symptoms (Depressive Signs/Symptoms)  Outcome: Progressing     Problem: Depressive Signs/Symptoms  Goal: Optimized Nutrition Intake (Depressive Signs/Symptoms)  Outcome: Progressing     Problem: Depressive Signs/Symptoms  Goal: Improved Sleep (Depressive Signs/Symptoms)  Outcome: Progressing

## 2025-07-24 PROCEDURE — 25000003 PHARM REV CODE 250: Performed by: PSYCHIATRY & NEUROLOGY

## 2025-07-24 PROCEDURE — 25000003 PHARM REV CODE 250: Performed by: STUDENT IN AN ORGANIZED HEALTH CARE EDUCATION/TRAINING PROGRAM

## 2025-07-24 PROCEDURE — 11400000 HC PSYCH PRIVATE ROOM

## 2025-07-24 PROCEDURE — 99232 SBSQ HOSP IP/OBS MODERATE 35: CPT | Mod: SA,HB,, | Performed by: PSYCHIATRY & NEUROLOGY

## 2025-07-24 PROCEDURE — 90833 PSYTX W PT W E/M 30 MIN: CPT | Mod: SA,HB,, | Performed by: PSYCHIATRY & NEUROLOGY

## 2025-07-24 RX ORDER — QUETIAPINE FUMARATE 100 MG/1
400 TABLET, FILM COATED ORAL NIGHTLY
Status: DISCONTINUED | OUTPATIENT
Start: 2025-07-24 | End: 2025-07-26 | Stop reason: HOSPADM

## 2025-07-24 RX ORDER — DIAZEPAM 5 MG/1
10 TABLET ORAL 2 TIMES DAILY
Status: DISCONTINUED | OUTPATIENT
Start: 2025-07-24 | End: 2025-07-26 | Stop reason: HOSPADM

## 2025-07-24 RX ADMIN — DIAZEPAM 10 MG: 5 TABLET ORAL at 08:07

## 2025-07-24 RX ADMIN — NALTREXONE HYDROCHLORIDE 50 MG: 50 TABLET, FILM COATED ORAL at 08:07

## 2025-07-24 RX ADMIN — SERTRALINE HYDROCHLORIDE 75 MG: 50 TABLET ORAL at 08:07

## 2025-07-24 RX ADMIN — AMLODIPINE BESYLATE 10 MG: 10 TABLET ORAL at 08:07

## 2025-07-24 RX ADMIN — FOLIC ACID 1 MG: 1 TABLET ORAL at 08:07

## 2025-07-24 RX ADMIN — LOSARTAN POTASSIUM 50 MG: 50 TABLET, FILM COATED ORAL at 08:07

## 2025-07-24 RX ADMIN — ATORVASTATIN CALCIUM 20 MG: 20 TABLET, FILM COATED ORAL at 08:07

## 2025-07-24 RX ADMIN — QUETIAPINE FUMARATE 400 MG: 100 TABLET ORAL at 08:07

## 2025-07-24 RX ADMIN — Medication 100 MG: at 08:07

## 2025-07-24 RX ADMIN — THERA TABS 1 TABLET: TAB at 08:07

## 2025-07-24 NOTE — PROGRESS NOTES
"PSYCHIATRY DAILY INPATIENT PROGRESS NOTE  SUBSEQUENT HOSPITAL VISIT    ENCOUNTER DATE: 7/24/2025  SITE: [facility]    DATE OF ADMISSION: 7/20/2025  8:04 AM  LENGTH OF STAY: 4 days      CHIEF COMPLAINT   Matthew Preston is a 59 y.o. male, seen during daily bernal rounds on the inpatient unit.  Matthew Preston presented with the chief complaint of Suicidal ideation, hallucinations      The patient was seen and examined. The chart was reviewed.     Reviewed notes from Rns and MD and labs from the last 24 hours.    The patient's case was discussed with the treatment team/care providers today including Rns and MD    Staff reports no behavioral or management issues.     The patient has been compliant with treatment.      Subjective 07/24/2025       Patient reported mood is "pretty good", affect is congruent. Continues to deny subjective s/s of etoh withdrawal. No tremor noted, no apparent distress. Denies suicidal and homicidal ideation. He continues to express plan to return to group home in [place], after which he will resume AA meetings. Patient started naltrexone yesterday with no complication. Declined vivitrol injection.     Likely candidate for discharge tomorrow.       Psychiatric ROS (observed, reported, or endorsed/denied):  Depressed mood - No  Interest/pleasure/anhedonia: No  Guilt/hopelessness/worthlessness - No  Changes in Sleep - No  Changes in Appetite - No  Changes in Concentration - No  Changes in Energy - less  PMA/R- No  Suicidal- active/passive ideations - less  Homicidal ideations: active/passive ideations - No    Hallucinations - No  Delusions - No  Disorganized behavior - No  Disorganized speech - No  Negative symptoms - No    Elevated mood - No  Decreased need for sleep - No  Grandiosity - No  Racing thoughts - No  Impulsivity - No  Irritability- No  Increased energy - No  Distractibility - No  Increase in goal-directed activity or PMA- No    Symptoms of KELI - No  Symptoms of Panic " Disorder- No  Symptoms of PTSD - No        Overall progress: Patient is showing mild improvement         Psychotherapy:  Target symptoms: alcohol abuse, depression  Why chosen therapy is appropriate versus another modality: relevant to diagnosis, evidence based practice  Outcome monitoring methods: self-report, observation  Therapeutic intervention type: insight oriented psychotherapy, supportive psychotherapy  Topics discussed/themes: building skills sets for symptom management, symptom recognition, substance abuse  The patient's response to the intervention is accepting. The patient's progress toward treatment goals is fair.   Duration of intervention: 20 minutes.        Medical ROS  Review of Systems   Constitutional: Negative.    HENT: Negative.     Eyes: Negative.    Respiratory: Negative.     Cardiovascular: Negative.    Gastrointestinal:  Positive for nausea.   Genitourinary: Negative.    Musculoskeletal: Negative.    Skin: Negative.    Neurological:  Positive for tremors.   Endo/Heme/Allergies: Negative.    Psychiatric/Behavioral:  Positive for depression.          PAST MEDICAL HISTORY   Past Medical History:   Diagnosis Date    Depression     Diabetes mellitus     Hepatitis C antibody positive in blood 01/12/2025    RNA NEGATIVE    History of psychiatric care     History of psychiatric hospitalization     Multiple gastric ulcers     Psychiatric exam requested by authority     Psychiatric problem     Schizoaffective disorder, depressive type     Therapy            PSYCHOTROPIC MEDICATIONS   Scheduled Meds:   amLODIPine  10 mg Oral Daily    atorvastatin  20 mg Oral QHS    diazePAM  10 mg Oral BID    folic acid  1 mg Oral Daily    losartan  50 mg Oral Daily    multivitamin  1 tablet Oral Daily    naltrexone  50 mg Oral Daily    QUEtiapine  400 mg Oral QHS    sertraline  75 mg Oral Daily    thiamine  100 mg Oral Daily     Continuous Infusions:  PRN Meds:.  Current Facility-Administered Medications:      "acetaminophen, 650 mg, Oral, Q6H PRN    calcium carbonate, 1,000 mg, Oral, Q8H PRN    haloperidoL, 5 mg, Oral, Q4H PRN **AND** diphenhydrAMINE, 50 mg, Oral, Q4H PRN **AND** LORazepam, 2 mg, Oral, Q4H PRN **AND** haloperidol lactate, 5 mg, Intramuscular, Q4H PRN **AND** diphenhydrAMINE, 50 mg, Intramuscular, Q4H PRN **AND** lorazepam, 2 mg, Intramuscular, Q4H PRN    guaiFENesin 100 mg/5 ml, 200 mg, Oral, Q4H PRN    hydrOXYzine pamoate, 50 mg, Oral, Q6H PRN    LORazepam, 2 mg, Oral, Q4H PRN    melatonin, 6 mg, Oral, Nightly PRN    nicotine, 1 patch, Transdermal, Daily PRN    ondansetron, 4 mg, Oral, Q8H PRN        EXAMINATION    VITALS   Vitals:    07/23/25 0709 07/23/25 0800 07/23/25 1906 07/24/25 0713   BP: 124/72 124/72 134/75 119/78   BP Location: Left arm Left arm Left arm Left arm   Patient Position: Sitting Sitting Sitting Sitting   Pulse: 89 89 81 85   Resp: 20 20 18 18   Temp: 97.9 °F (36.6 °C) 97.9 °F (36.6 °C) 98.3 °F (36.8 °C) 97.8 °F (36.6 °C)   TempSrc: Oral Oral Oral Oral   SpO2: 97% 97% 97% 96%   Weight:       Height:           Body mass index is 28.03 kg/m².        CONSTITUTIONAL  General Appearance: unremarkable, age appropriate    MUSCULOSKELETAL  Muscle Strength and Tone:no tremor, no tic  Abnormal Involuntary Movements: No  Gait and Station: not observed    PSYCHIATRIC   Level of Consciousness: awake, alert , and oriented  Orientation: person, place, time, and situation  Grooming: Casually dressed and Well groomed  Psychomotor Behavior: normal, cooperative, friendly and cooperative  Speech: normal tone, normal rate, normal pitch, normal volume  Language: grossly intact  Mood: "Good"  Affect: Consistent with mood  Thought Process: linear, logical  Associations: intact   Thought Content: less SI, denies HI, and no delusions  Perceptions: denies AH, denies  VH, and not RIS  Memory: Able to recall past events, Remote intact, and Recent intact  Attention:Attends to interview without distraction  Fund " of Knowledge: Aware of current events and Vocabulary appropriate   Estimate if Intelligence:  Average based on work/education history, vocabulary and mental status exam  Insight: has awareness of illness  Judgment: behavior is adequate to circumstances        DIAGNOSTIC TESTING   Laboratory Results  No results found for this or any previous visit (from the past 24 hours).            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      Psychosis  - Seroquel 100mg PO QHS with plan to titrate (previous dose 400mg HS)- Increase to 200 mg qhs   -Increase to 300 mg qhs-Increase to 400 mg qhs  Anxiety  - Zoloft as above  - Vistaril PRN  - Valium taper as below     Psychosocial stressors  - Counseled     Suicidal Ideation  - Counseled     EtOH Dependence  - Valium 10mg PO TID with plan to taper as able-increase to QID-Taper to TID-Taper to BID  - CIWA, Vsq4h, WD precautions  - Ativan PRN  - Counseled  -Initiate naltrexone po 50 mg/day          Discussed diagnosis, risks and benefits of proposed treatment vs alternative treatments vs no treatment, potential side effects of these treatments and the inherent unpredictability of treatment. The patient expresses understanding of the above and displays the capacity to agree with this treatment given said understanding. Patient also agrees that, currently, the benefits outweigh the risks and would like to pursue/continue treatment at this time.    Any medications being used off-label were discussed with the patient inclusive of the evidence base for the use of the medications and consent was obtained for the off-label use of the medication.       DISCHARGE  PLANNING  Expected Disposition Plan: tbd      NEED FOR CONTINUED HOSPITALIZATION  Psychiatric illness continues to pose a potential threat to life or bodily function, of self or others, thereby requiring the need for continued inpatient psychiatric hospitalization: Yes, due to: suicidal ideation, as evidenced by:  Ongoing concerns with SI.    Protective inpatient pyschiatric hospitalization required while a safe disposition plan is enacted: Yes    Patient stabilized and ready for discharge from inpatient psychiatric unit: No        STAFF:   Ac Mane NP  Psychiatry

## 2025-07-24 NOTE — PLAN OF CARE
Problem: Adult Behavioral Health Plan of Care  Goal: Rounds/Family Conference  Flowsheets (Taken 7/24/2025 1310)  Participants: (counselor)   psychiatrist   other (see comments)     Summary: Treatment team meeting     Pt is tolerating Valium taper well. Mostly calm with occasional agitation. Projected DC on 7/25.

## 2025-07-24 NOTE — CARE UPDATE
On two brief yet separate occasions, the patient stated that he wanted some quiet time; he was approached about coming to group psychotherapy and he did not attend. He was not also interested in having an individual session to review the topic he may have missed while he was in group psychotherapy today.     During a third encounter, the patient stated that he is very interested in reviewing the brochures that this counselor handed out to the other patients. The patient remarked that he has been speaking with the other patients and they made some comments about the session that he now regrets not attending. The patient stated that he will continue to discuss topics on how to manage anxiety and lessen depression by not only reading the brochures given to him today but also by continuing to speak with the other patients about what they may have gotten out of the group session.     The patient was commended for coming up with this idea.       Plan    To continue to give the patient homework assignments and to review with him how valuable it may be for him to seriously consider attending group psychotherapy.

## 2025-07-24 NOTE — PLAN OF CARE
POC reviewed this shift and is ongoing.   Pt in dayroom on start of shift watching tv with peers. No ss of distress. Pt denies SI, HI, A/V hallucinations. Pt took snack, vs and meds. No adverse reactions. Will continue to monitor for changes and safety.

## 2025-07-24 NOTE — NURSING
"Patient cooperative with no negative behaviors noted. Patient interacts with staff and peers ad conner, sits in common area, ambulates to his room at intervals and naps. Appetite is good for meals and snacks, and is compliant with medication without side effects noted. Patient continues on Diazepam taper  and Naltrexone still denying withdrawal symptoms with continued CIWA monitoring. Patient refused psychotherapy group session today with "Tripp", did accept literature from session, and staff continues to encourage the importance of attendance. Ac Mane NP visited per telemed this am with medication changes (see emar). Close observations continued and safe environment maintained. Patient is a candidate for discharge tomorrow.   " December 18, 2018    Schuyler Martin  [address] [place] [id number]          Dear Schuyler Martin:  MRN: [id number]    This is a follow up to your recent labs, your lab results were stable.  There are no medicine changes.  Please have your labs drawn again on 3/11/19.      If you cannot have your labs drawn on the scheduled date, it is your responsibility to call the transplant department to reschedule.  To reschedule or make an appointment, please as to speak to or leave a message for my assistant, Alice Shepherd, at [phone].  When leaving a message for Cora Jenkins, or myself, we ask that you leave a brief message regarding your request.    Sincerely,      John Livingston RN, BSN  [facility] Coordinator    [facility]  [address] [place] [id number] [phone]

## 2025-07-24 NOTE — NURSING
Pt to nurse's station requesting additional snacks.  Pt reminded that snacks were just given out about an hour ago.  Pt redirected to his room, walked off cursing and mumbling under his breath.

## 2025-07-24 NOTE — PLAN OF CARE
Problem: Adult Behavioral Health Plan of Care  Goal: Plan of Care Review  Outcome: Progressing     Problem: Adult Behavioral Health Plan of Care  Goal: Patient-Specific Goal (Individualization)  Outcome: Progressing     Problem: Adult Behavioral Health Plan of Care  Goal: Adheres to Safety Considerations for Self and Others  Outcome: Progressing     Problem: Adult Behavioral Health Plan of Care  Goal: Absence of New-Onset Illness or Injury  Outcome: Progressing     Problem: Adult Behavioral Health Plan of Care  Goal: Optimized Coping Skills in Response to Life Stressors  Outcome: Progressing     Problem: Diabetes Comorbidity  Goal: Blood Glucose Level Within Targeted Range  Outcome: Progressing     Problem: Suicide Risk  Goal: Absence of Self-Harm  Outcome: Progressing     Problem: Excessive Substance Use  Goal: Optimized Energy Level (Excessive Substance Use)  Outcome: Progressing     Problem: Excessive Substance Use  Goal: Increased Participation and Engagement (Excessive Substance Use)  Outcome: Progressing     Problem: Depressive Signs/Symptoms  Goal: Optimized Energy Level (Depressive Signs/Symptoms)  Outcome: Progressing     Problem: Depressive Signs/Symptoms  Goal: Improved Mood Symptoms (Depressive Signs/Symptoms)  Outcome: Progressing     Problem: Depressive Signs/Symptoms  Goal: Improved Sleep (Depressive Signs/Symptoms)  Outcome: Progressing

## 2025-07-25 VITALS
TEMPERATURE: 98 F | OXYGEN SATURATION: 94 % | HEART RATE: 93 BPM | WEIGHT: 189.81 LBS | SYSTOLIC BLOOD PRESSURE: 118 MMHG | HEIGHT: 69 IN | RESPIRATION RATE: 18 BRPM | BODY MASS INDEX: 28.11 KG/M2 | DIASTOLIC BLOOD PRESSURE: 67 MMHG

## 2025-07-25 PROCEDURE — 99239 HOSP IP/OBS DSCHRG MGMT >30: CPT | Mod: SA,HB,, | Performed by: PSYCHIATRY & NEUROLOGY

## 2025-07-25 PROCEDURE — 25000003 PHARM REV CODE 250: Performed by: PSYCHIATRY & NEUROLOGY

## 2025-07-25 PROCEDURE — 25000003 PHARM REV CODE 250: Performed by: STUDENT IN AN ORGANIZED HEALTH CARE EDUCATION/TRAINING PROGRAM

## 2025-07-25 PROCEDURE — 11400000 HC PSYCH PRIVATE ROOM

## 2025-07-25 RX ORDER — QUETIAPINE FUMARATE 400 MG/1
400 TABLET, FILM COATED ORAL NIGHTLY
Qty: 30 TABLET | Refills: 1 | Status: SHIPPED | OUTPATIENT
Start: 2025-07-25 | End: 2026-07-25

## 2025-07-25 RX ORDER — SERTRALINE HYDROCHLORIDE 100 MG/1
100 TABLET, FILM COATED ORAL DAILY
Status: DISCONTINUED | OUTPATIENT
Start: 2025-07-26 | End: 2025-07-26 | Stop reason: HOSPADM

## 2025-07-25 RX ORDER — NALTREXONE HYDROCHLORIDE 50 MG/1
50 TABLET, FILM COATED ORAL DAILY
Qty: 30 TABLET | Refills: 0 | Status: SHIPPED | OUTPATIENT
Start: 2025-07-26 | End: 2025-08-25

## 2025-07-25 RX ORDER — SERTRALINE HYDROCHLORIDE 100 MG/1
100 TABLET, FILM COATED ORAL DAILY
Qty: 30 TABLET | Refills: 1 | Status: SHIPPED | OUTPATIENT
Start: 2025-07-26 | End: 2026-07-26

## 2025-07-25 RX ADMIN — NALTREXONE HYDROCHLORIDE 50 MG: 50 TABLET, FILM COATED ORAL at 08:07

## 2025-07-25 RX ADMIN — SERTRALINE HYDROCHLORIDE 75 MG: 50 TABLET ORAL at 08:07

## 2025-07-25 RX ADMIN — DIAZEPAM 10 MG: 5 TABLET ORAL at 08:07

## 2025-07-25 RX ADMIN — THERA TABS 1 TABLET: TAB at 08:07

## 2025-07-25 RX ADMIN — AMLODIPINE BESYLATE 10 MG: 10 TABLET ORAL at 08:07

## 2025-07-25 RX ADMIN — LOSARTAN POTASSIUM 50 MG: 50 TABLET, FILM COATED ORAL at 08:07

## 2025-07-25 RX ADMIN — FOLIC ACID 1 MG: 1 TABLET ORAL at 08:07

## 2025-07-25 RX ADMIN — Medication 100 MG: at 08:07

## 2025-07-25 NOTE — DISCHARGE SUMMARY
"Discharge Summary  Psychiatry    Admit Date: 7/20/2025    Discharge Date and Time: 07/25/2025 9:17 AM    Attending Physician: Sharad Ulloa MD     Discharge Provider: Ac Mane    Reason for Admission:  Suicidal ideation, alcohol abuse    History of Present Illness:   HPI     7/19/2025, 11:42 PM  History obtained from the patient and medical records                  History of Present Illness: Matthew Preston is a 59 y.o. male patient with a PMHx of depression, psychiatric hospitalization, schizoaffective disorder, DM, and gastric ulcers who presents to the Emergency Department for psychiatric evaluation. Pt states that he has been experiencing depression and SI for the past few weeks. When asked if he has a plan, pt stated "I do, but I'm not going to tell you." Pt also states that he is hearing voices that are telling him "all kinds of shit." He is not compliant with his daily medications. Pt expresses that he has been wanting to hurt people, but will not elaborate because he thinks the police will be called. He admits to EtOH use, but denies any drug use. Patient denies any visual hallucinations. No prior Tx specified.  No further complaints or concerns at this time.         Arrival mode: Ambulance Service     Per Chart Review:  Encounter from [facility] last month reviewed.  Presented with depression, AH, SI and alcohol dependence. Discharged on Abilify 10mg daily and celexa 40mg PO daily with Dx of MDD and alcohol dependence.      Per Initial Interview:  Matthew Preston is a 59 y.o. male with past psychiatric history of Depression, alcohol abuse who presents with SI and AH.      Pt reports that he is here because he has been experiencing depression and SI in the setting of medication non-adherence. States that he has been feeling depressed for about 1-2 weeks. During this time pt began having SI, which he attributes to losing his daughter about a month ago to an overdose. Does feel this is the trigger " "for his symptoms.      Discussed medications provided during previous hospitalization. States it did not work for him so he stopped taking it. Notes seroquel as being helpful in the past.      Pt endorsing daily use of alcohol, approximately "as much as I can get." Clarifies that this is up to a fifth and a half per day. Last drink was prior to presentation. Currently endorsing ETOH WD Sx of tremor, which is noted on exam. Denies history of seizures during withdrawal. Has tolerated valium for detox int he past.      Pt reports SI as recently as last night. States he has not thought of a plan while sober, but believes he likely had a plan while drinking.      Endorses intermittent AH. Describes this as voices that talk to him. Sometimes they tell him to hurt himself. Unable to further clarify.      Endorsing Paranoia of people out to get him.     Procedures Performed: * No surgery found *    Hospital Course:    Patient was admitted to the inpatient psychiatry unit after being medically cleared in the ED. Chart and labs were reviewed. 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      Psychosis  - Seroquel 100mg PO QHS with plan to titrate (previous dose 400mg HS)- Increase to 200 mg qhs   -Increase to 300 mg qhs-Increase to 400 mg qhs  Anxiety  - Zoloft as above  - Vistaril PRN  - Valium taper as below     Psychosocial stressors  - Counseled     Suicidal Ideation  - Counseled     EtOH Dependence  - Valium 10mg PO TID with plan to taper as able-increase to QID-Taper to TID-Taper to BID  - CIWA, Vsq4h, WD precautions  - Ativan PRN  - Counseled  -Initiate naltrexone po 50 mg/day        The patient reports " improved symptoms as documented. The patient is requesting discharge.The patient is hopeful, future oriented and goal directed. The patient readily discusses both short and long term goals. The patient can identify positive coping skills and social support. AIMS score was 0. During hospitalization, the patient was encouraged to go to both groups and individual counseling. Patient was monitored for any side effects. A meeting was held with multidisciplinary team prior to discharge and pt's diagnosis, current medications, and follow up were discussed. The patient has been compliant with treatment and can adequately attend to activities of daily living in an independent manner. The patient denies any side effects. The patient denies SI, HI, plan or intent for self harm or harm to others. The patient is no longer a danger to self or others nor gravely disabled disabled. Patient discharged  in stable condition with scheduled outpatient follow up.      Discussed diagnosis, risks and benefits of proposed treatment vs alternative treatments vs no treatment, and potential side effects of these treatments.  The patient expresses understanding of the above and displays the capacity to agree with this treatment given said understanding.  Patient also agrees that, currently, the benefits outweigh the risks and would like to pursue treatment at this time.      Discharge MSE: stated age, casually dressed, well groomed.  No psychomotor agitation or retardation.  No abnormal involuntary movements.  Gait normal.  Speech normal, conversational.  Language fluent English. Mood fine.  Affect normal range, pleasant, euthymic.  Thought process linear.  Associations intact.  Denies suicidal or homicidal ideation.  Denies auditory hallucinations, paranoid ideation, ideas of reference.  Memory intact.  Attention intact.  Fund of knowledge intact.  Insight intact.  Judgment intact.  Alert and oriented to person, place, time.      Tobacco  Usage:  Is patient a smoker? No  Does patient want prescription for Tobacco Cessation? No  Does patient want counseling for Tobacco Cessation? No    If patient would like to quit, then over the counter nicotine patch could be used. The patient could also follow up with his PCP or psychiatric provider for other alternatives.     Final Diagnoses:    Principal Problem: Alcohol dependence    Secondary Diagnoses:   Suicidal ideation     Labs:  Admission on 07/20/2025   Component Date Value Ref Range Status    POCT Glucose 07/20/2025 108  70 - 110 mg/dL Final    POCT Glucose 07/20/2025 111 (H)  70 - 110 mg/dL Final    Cholesterol Total 07/21/2025 88 (L)  120 - 199 mg/dL Final    Triglyceride 07/21/2025 134  30 - 150 mg/dL Final    HDL Cholesterol 07/21/2025 42  40 - 75 mg/dL Final    LDL Cholesterol 07/21/2025 19.2 (L)  63.0 - 159.0 mg/dL Final    HDL/Cholesterol Ratio 07/21/2025 47.7  20.0 - 50.0 % Final    Cholesterol/HDL Ratio 07/21/2025 2.1  2.0 - 5.0 Final    Non HDL Cholesterol 07/21/2025 46  mg/dL Final    Hemoglobin A1c 07/21/2025 5.8 (H)  4.0 - 5.6 % Final    Estimated Average Glucose 07/21/2025 120  68 - 131 mg/dL Final    POCT Glucose 07/21/2025 100  70 - 110 mg/dL Final    POCT Glucose 07/21/2025 145 (H)  70 - 110 mg/dL Final    POCT Glucose 07/21/2025 117 (H)  70 - 110 mg/dL Final    POCT Glucose 07/22/2025 117 (H)  70 - 110 mg/dL Final   Admission on 07/19/2025, Discharged on 07/20/2025   Component Date Value Ref Range Status    HIV 1/2 Ag/Ab 07/19/2025 Negative  Negative Final    Sodium 07/19/2025 140  136 - 145 mmol/L Final    Potassium 07/19/2025 3.3 (L)  3.5 - 5.1 mmol/L Final    Chloride 07/19/2025 108  95 - 110 mmol/L Final    CO2 07/19/2025 22 (L)  23 - 29 mmol/L Final    Glucose 07/19/2025 100  70 - 110 mg/dL Final    BUN 07/19/2025 10  6 - 20 mg/dL Final    Creatinine 07/19/2025 0.8  0.5 - 1.4 mg/dL Final    Calcium 07/19/2025 10.1  8.7 - 10.5 mg/dL Final    Protein Total 07/19/2025 7.6  6.0 - 8.4  gm/dL Final    Albumin 07/19/2025 3.7  3.5 - 5.2 g/dL Final    Bilirubin Total 07/19/2025 0.2  0.1 - 1.0 mg/dL Final    ALP 07/19/2025 71  40 - 150 unit/L Final    AST 07/19/2025 43  11 - 45 unit/L Final    ALT 07/19/2025 51 (H)  10 - 44 unit/L Final    Anion Gap 07/19/2025 10  8 - 16 mmol/L Final    eGFR 07/19/2025 >60  >60 mL/min/1.73/m2 Final    Color, UA 07/19/2025 Colorless (A)  Straw, Radha, Yellow, Light-Orange Final    Appearance, UA 07/19/2025 Clear  Clear Final    pH, UA 07/19/2025 7.0  5.0 - 8.0 Final    Spec Grav UA 07/19/2025 1.005  1.005 - 1.030 Final    Protein, UA 07/19/2025 Negative  Negative Final    Glucose, UA 07/19/2025 Negative  Negative Final    Ketones, UA 07/19/2025 Negative  Negative Final    Bilirubin, UA 07/19/2025 Negative  Negative Final    Blood, UA 07/19/2025 1+ (A)  Negative Final    Nitrites, UA 07/19/2025 Negative  Negative Final    Urobilinogen, UA 07/19/2025 Negative  <2.0 EU/dL Final    Leukocyte Esterase, UA 07/19/2025 2+ (A)  Negative Final    Benzodiazepine, Urine 07/19/2025 Negative  Negative Final    Methadone, Urine 07/19/2025 Negative  Negative Final    Cocaine, Urine 07/19/2025 Negative  Negative Final    Opiates, Urine 07/19/2025 Negative  Negative Final    Barbiturates, Urine 07/19/2025 Negative  Negative Final    Amphetamines, Urine 07/19/2025 Negative  Negative Final    THC 07/19/2025 Negative  Negative Final    Phencyclidine, Urine 07/19/2025 Negative  Negative Final    Urine Creatinine 07/19/2025 49.4  23.0 - 375.0 mg/dL Final    Alcohol, Serum 07/19/2025 176 (H)  <10 mg/dL Final    Acetaminophen Level 07/19/2025 <3.0 (L)  10.0 - 20.0 ug/ml Final    SARS COV-2 Molecular 07/19/2025 Negative  Negative Final    TSH 07/19/2025 1.570  0.400 - 4.000 uIU/mL Final    Salicylate Level 07/19/2025 <5.0 (L)  15.0 - 30.0 mg/dL Final    WBC 07/19/2025 9.98  3.90 - 12.70 K/uL Final    RBC 07/19/2025 4.44 (L)  4.60 - 6.20 M/uL Final    HGB 07/19/2025 13.9 (L)  14.0 - 18.0 gm/dL  Final    HCT 07/19/2025 41.2  40.0 - 54.0 % Final    MCV 07/19/2025 93  82 - 98 fL Final    MCH 07/19/2025 31.3 (H)  27.0 - 31.0 pg Final    MCHC 07/19/2025 33.7  32.0 - 36.0 g/dL Final    RDW 07/19/2025 13.7  11.5 - 14.5 % Final    Platelet Count 07/19/2025 254  150 - 450 K/uL Final    MPV 07/19/2025 10.3  9.2 - 12.9 fL Final    Nucleated RBC 07/19/2025 0  <=0 /100 WBC Final    Neut % 07/19/2025 47.3  38 - 73 % Final    Lymph % 07/19/2025 43.7  18 - 48 % Final    Mono % 07/19/2025 6.8  4 - 15 % Final    Eos % 07/19/2025 1.0  <=8 % Final    Basophil % 07/19/2025 0.9  <=1.9 % Final    Imm Grans % 07/19/2025 0.3  0.0 - 0.5 % Final    Neut # 07/19/2025 4.72  1.8 - 7.7 K/uL Final    Lymph # 07/19/2025 4.36  1 - 4.8 K/uL Final    Mono # 07/19/2025 0.68  0.3 - 1 K/uL Final    Eos # 07/19/2025 0.10  <=0.5 K/uL Final    Baso # 07/19/2025 0.09  <=0.2 K/uL Final    Imm Grans # 07/19/2025 0.03  0.00 - 0.04 K/uL Final    Extra Tube 07/19/2025 Hold for add-ons.   Final    RBC, UA 07/19/2025 1  0 - 4 /HPF Final    WBC, UA 07/19/2025 50 (H)  0 - 5 /HPF Final    Microscopic Comment 07/19/2025    Final    Urine Culture 07/19/2025 No Growth   Final    Magnesium  07/19/2025 1.3 (L)  1.6 - 2.6 mg/dL Final    Alcohol, Serum 07/20/2025 <10  <10 mg/dL Final         Discharged Condition: stable and improved; not currently a danger to self/others or gravely disabled    Disposition: Home or Self Care    Is patient being discharged on multiple neuroleptics? No    Follow Up/Patient Instructions:     Take all medications as prescribed.  Attend all psychiatric and medical follow up appointments.   Abstain from all drugs and alcohol.  Call the crisis line at: [phone] for help in a crisis and emergent situations or call 911 and Return to ED for any acute worsening of your condition including suicidal or homicidal ideations      No discharge procedures on file.        Follow up apt: See laura note      Medications:  Reconciled Home Medications:       Medication List        START taking these medications      naltrexone 50 mg tablet  Commonly known as: DEPADE  Take 1 tablet (50 mg total) by mouth once daily.  Start taking on: July 26, 2025     QUEtiapine 400 MG tablet  Commonly known as: SEROQUEL  Take 1 tablet (400 mg total) by mouth every evening.     sertraline 100 MG tablet  Commonly known as: ZOLOFT  Take 1 tablet (100 mg total) by mouth once daily.  Start taking on: July 26, 2025            STOP taking these medications      amLODIPine 10 MG tablet  Commonly known as: NORVASC     ARIPiprazole 10 MG Tab  Commonly known as: ABILIFY     atorvastatin 20 MG tablet  Commonly known as: LIPITOR     cefdinir 300 MG capsule  Commonly known as: OMNICEF     citalopram 40 MG tablet  Commonly known as: CeleXA     folic acid 1 MG tablet  Commonly known as: FOLVITE     losartan 50 MG tablet  Commonly known as: COZAAR     magnesium oxide 400 mg magnesium Tab     metFORMIN 500 MG tablet  Commonly known as: GLUCOPHAGE     pantoprazole 40 MG tablet  Commonly known as: PROTONIX     potassium chloride 10 MEQ Tbsr  Commonly known as: KLOR-CON                Discharge Procedure Orders   Diet Adult Regular      Notify your health care provider if you experience any of the following:  temperature >100.4      Notify your health care provider if you experience any of the following:  persistent nausea and vomiting or diarrhea          Notify your health care provider if you experience any of the following:   Order Comments: Suicidal thoughts, homicidal thoughts, or any other changes in mental status  If you would like immediate help/crisis counseling, please call [phone] (TALK). Through this toll-free phone number for a network of crisis centers across the country. These centers staff their lines with people who are trained to listen and offer support to people in emotional crisis. If you are in an emergency, please call 483.      Notify your health care provider if you  experience any of the following:  increased confusion or weakness      Notify your health care provider if you experience any of the following:  persistent dizziness, light-headedness, or visual disturbances      Activity as tolerated        Total time spent discharging patient: 35 minutes    Ac Mane NP  Psychiatry

## 2025-07-25 NOTE — CARE UPDATE
The patient was given a final brochure on depression since he is being prepared for discharge. The patient acknowledged the brochure.

## 2025-07-25 NOTE — CARE UPDATE
The patient was extremely polite today when he refused to attend group psychotherapy; he remarked that he was no longer depressed and will read the information on depression given to him in the brochures; he commented that the still has not totally read the brochure on generalized anxiety disorder but he did find it helpful when he reviewed briefly the outline.     Plan    The patient will be encouraged to continue to seriously consider attending group psychotherapy in the future.

## 2025-07-25 NOTE — NURSING
POC reviewed this shift and is ongoing.  Pt is medication compliant.  Pt is increasingly demanding with staff.  Previous shift, pt demanded extra snacks less than an hour after snack time. Tonight, pt demanded 'coke in a can' after bedtime.  Pt reminded that soft drinks are only available during snack time.  Pt irritable, continued demanding coke.  Pt stated that there were cokes in the snack closet and they were 'for us, not you.'  Pt remained irritable but returned to his room.

## 2025-07-25 NOTE — NURSING
Patient discharged from unit, received instructions, voiced understanding. Patient belongings verified and returned. Patient escorted from unit by staff in stable condition without incident.

## 2025-07-25 NOTE — PLAN OF CARE
Problem: Adult Behavioral Health Plan of Care  Goal: Plan of Care Review  Outcome: Progressing  Goal: Patient-Specific Goal (Individualization)  Outcome: Progressing  Goal: Adheres to Safety Considerations for Self and Others  Outcome: Progressing  Goal: Absence of New-Onset Illness or Injury  Outcome: Progressing  Goal: Optimized Coping Skills in Response to Life Stressors  Outcome: Progressing  Goal: Develops/Participates in Therapeutic [place] to Support Successful Transition  Outcome: Progressing  Goal: Rounds/Family Conference  Outcome: Progressing     Problem: Diabetes Comorbidity  Goal: Blood Glucose Level Within Targeted Range  Outcome: Progressing     Problem: Suicide Risk  Goal: Absence of Self-Harm  Outcome: Progressing     Problem: Fall Injury Risk  Goal: Absence of Fall and Fall-Related Injury  Outcome: Progressing     Problem: Excessive Substance Use  Goal: Optimized Energy Level (Excessive Substance Use)  Outcome: Progressing  Goal: Improved Behavioral Control (Excessive Substance Use)  Outcome: Progressing  Goal: Increased Participation and Engagement (Excessive Substance Use)  Outcome: Progressing  Goal: Improved Physiologic Symptoms (Excessive Substance Use)  Outcome: Progressing  Goal: Enhanced Social, Occupational or Functional Skills (Excessive Substance Use)  Outcome: Progressing     Problem: Depressive Signs/Symptoms  Goal: Optimized Energy Level (Depressive Signs/Symptoms)  Outcome: Progressing  Goal: Optimized Cognitive Function (Depressive Signs/Symptoms)  Outcome: Progressing  Goal: Increased Participation and Engagement (Depressive Signs/Symptoms)  Outcome: Progressing  Goal: Enhanced Self-Esteem and Confidence (Depressive Signs/Symptoms)  Outcome: Progressing  Goal: Improved Mood Symptoms (Depressive Signs/Symptoms)  Outcome: Progressing  Goal: Optimized Nutrition Intake (Depressive Signs/Symptoms)  Outcome: Progressing  Goal: Improved Psychomotor Symptoms (Depressive  Signs/Symptoms)  Outcome: Progressing  Goal: Improved Sleep (Depressive Signs/Symptoms)  Outcome: Progressing  Goal: Enhanced Social, Occupational or Functional Skills (Depressive Signs/Symptoms)  Outcome: Progressing

## 2025-07-25 NOTE — PLAN OF CARE
Problem: Adult Behavioral Health Plan of Care  Goal: Plan of Care Review  7/25/2025 1535 by Pamela Lockwood LPN  Outcome: Progressing  7/25/2025 1529 by Pamela Lockwood LPN  Outcome: Progressing  Goal: Patient-Specific Goal (Individualization)  7/25/2025 1535 by Pamela Lockwood LPN  Outcome: Progressing  7/25/2025 1529 by Pamela Lockwood LPN  Outcome: Progressing  Goal: Adheres to Safety Considerations for Self and Others  7/25/2025 1535 by Pamela Lockwood LPN  Outcome: Progressing  7/25/2025 1529 by Pamela Lockwood LPN  Outcome: Progressing  Goal: Absence of New-Onset Illness or Injury  7/25/2025 1535 by Pamela Lockwood LPN  Outcome: Progressing  7/25/2025 1529 by Pamela Lockwood LPN  Outcome: Progressing  Goal: Optimized Coping Skills in Response to Life Stressors  7/25/2025 1535 by Pamela Lockwood LPN  Outcome: Progressing  7/25/2025 1529 by Pamela Lockwood LPN  Outcome: Progressing  Goal: Develops/Participates in Therapeutic [place] to Support Successful Transition  7/25/2025 1535 by Pamela Lockwood LPN  Outcome: Progressing  7/25/2025 1529 by Pamela Lockwood LPN  Outcome: Progressing  Goal: Rounds/Family Conference  7/25/2025 1535 by Pamela Lockwood LPN  Outcome: Progressing  7/25/2025 1529 by Pamela Lockwood LPN  Outcome: Progressing     Problem: Diabetes Comorbidity  Goal: Blood Glucose Level Within Targeted Range  7/25/2025 1535 by Pamela Lockwood LPN  Outcome: Progressing  7/25/2025 1529 by Pamela Lockwood LPN  Outcome: Progressing     Problem: Suicide Risk  Goal: Absence of Self-Harm  7/25/2025 1535 by Pamela Lockwood LPN  Outcome: Progressing  7/25/2025 1529 by Pamela Lockwood LPN  Outcome: Progressing     Problem: Fall Injury Risk  Goal: Absence of Fall and Fall-Related Injury  7/25/2025 1535 by Pamela Lockwood LPN  Outcome: Progressing  7/25/2025 1529 by Pamela Lockwood LPN  Outcome: Progressing     Problem: Excessive Substance Use  Goal: Optimized Energy Level (Excessive Substance Use)  7/25/2025 1535 by Pamela Lockwood LPN  Outcome:  Progressing  7/25/2025 1529 by Pamela Lockwood LPN  Outcome: Progressing  Goal: Improved Behavioral Control (Excessive Substance Use)  7/25/2025 1535 by Pamela Lockwood LPN  Outcome: Progressing  7/25/2025 1529 by Pamela Lockwood LPN  Outcome: Progressing  Goal: Increased Participation and Engagement (Excessive Substance Use)  7/25/2025 1535 by Pamela Lockwood LPN  Outcome: Progressing  7/25/2025 1529 by Pamela Lockwood LPN  Outcome: Progressing  Goal: Improved Physiologic Symptoms (Excessive Substance Use)  7/25/2025 1535 by Pamela Lockwood LPN  Outcome: Progressing  7/25/2025 1529 by Pamela Lockwood LPN  Outcome: Progressing  Goal: Enhanced Social, Occupational or Functional Skills (Excessive Substance Use)  7/25/2025 1535 by Pamela Lockwood LPN  Outcome: Progressing  7/25/2025 1529 by Pamela Lockwood LPN  Outcome: Progressing     Problem: Depressive Signs/Symptoms  Goal: Optimized Energy Level (Depressive Signs/Symptoms)  7/25/2025 1535 by Pamela Lockwood LPN  Outcome: Progressing  7/25/2025 1529 by Pamela Lockwood LPN  Outcome: Progressing  Goal: Optimized Cognitive Function (Depressive Signs/Symptoms)  7/25/2025 1535 by Pamela Lockwood LPN  Outcome: Progressing  7/25/2025 1529 by Pamela Lockwood LPN  Outcome: Progressing  Goal: Increased Participation and Engagement (Depressive Signs/Symptoms)  7/25/2025 1535 by Pamela Lockwood LPN  Outcome: Progressing  7/25/2025 1529 by Pamela Lockwood LPN  Outcome: Progressing  Goal: Enhanced Self-Esteem and Confidence (Depressive Signs/Symptoms)  7/25/2025 1535 by Pamela Lockwood LPN  Outcome: Progressing  7/25/2025 1529 by Pamela Lockwood LPN  Outcome: Progressing  Goal: Improved Mood Symptoms (Depressive Signs/Symptoms)  7/25/2025 1535 by Pamela Lockwood LPN  Outcome: Progressing  7/25/2025 1529 by Pamela Lockwood LPN  Outcome: Progressing  Goal: Optimized Nutrition Intake (Depressive Signs/Symptoms)  7/25/2025 1535 by Pamela Lockwood LPN  Outcome: Progressing  7/25/2025 1529 by Pamela Lockwood LPN  Outcome:  Progressing  Goal: Improved Psychomotor Symptoms (Depressive Signs/Symptoms)  7/25/2025 1535 by Pamela Lockwood LPN  Outcome: Progressing  7/25/2025 1529 by Pamela Lockwood LPN  Outcome: Progressing  Goal: Improved Sleep (Depressive Signs/Symptoms)  7/25/2025 1535 by Pamela Lockwood LPN  Outcome: Progressing  7/25/2025 1529 by Pamela Lockwood LPN  Outcome: Progressing  Goal: Enhanced Social, Occupational or Functional Skills (Depressive Signs/Symptoms)  7/25/2025 1535 by Pamela Lockwood LPN  Outcome: Progressing  7/25/2025 1529 by Pamela Lockwood LPN  Outcome: Progressing

## 2025-08-12 DIAGNOSIS — H27.02 APHAKIA OF EYE, LEFT: Primary | ICD-10-CM

## 2025-08-20 ENCOUNTER — HOSPITAL ENCOUNTER (EMERGENCY)
Facility: HOSPITAL | Age: 60
Discharge: PSYCHIATRIC OR SUBSTANCE USE FACILITY | End: 2025-08-21
Attending: EMERGENCY MEDICINE
Payer: MEDICAID

## 2025-08-20 DIAGNOSIS — R45.851 SUICIDAL IDEATION: Primary | ICD-10-CM

## 2025-08-20 PROCEDURE — 99285 EMERGENCY DEPT VISIT HI MDM: CPT

## 2025-08-21 VITALS
SYSTOLIC BLOOD PRESSURE: 139 MMHG | TEMPERATURE: 98 F | BODY MASS INDEX: 28.06 KG/M2 | OXYGEN SATURATION: 97 % | DIASTOLIC BLOOD PRESSURE: 82 MMHG | HEART RATE: 78 BPM | WEIGHT: 190 LBS | RESPIRATION RATE: 16 BRPM

## 2025-08-21 LAB
ABSOLUTE EOSINOPHIL (OHS): 0.4 K/UL
ABSOLUTE MONOCYTE (OHS): 1.03 K/UL (ref 0.3–1)
ABSOLUTE NEUTROPHIL COUNT (OHS): 8.7 K/UL (ref 1.8–7.7)
ALBUMIN SERPL BCP-MCNC: 4 G/DL (ref 3.5–5.2)
ALP SERPL-CCNC: 82 UNIT/L (ref 40–150)
ALT SERPL W/O P-5'-P-CCNC: 18 UNIT/L (ref 10–44)
AMPHET UR QL SCN: NEGATIVE
ANION GAP (OHS): 13 MMOL/L (ref 8–16)
APAP SERPL-MCNC: <3 UG/ML (ref 10–20)
AST SERPL-CCNC: 21 UNIT/L (ref 11–45)
BARBITURATE SCN PRESENT UR: NEGATIVE
BASOPHILS # BLD AUTO: 0.12 K/UL
BASOPHILS NFR BLD AUTO: 0.9 %
BENZODIAZ UR QL SCN: ABNORMAL
BILIRUB SERPL-MCNC: 0.2 MG/DL (ref 0.1–1)
BILIRUB UR QL STRIP.AUTO: NEGATIVE
BUN SERPL-MCNC: 17 MG/DL (ref 6–20)
CALCIUM SERPL-MCNC: 9.6 MG/DL (ref 8.7–10.5)
CANNABINOIDS UR QL SCN: NEGATIVE
CHLORIDE SERPL-SCNC: 109 MMOL/L (ref 95–110)
CLARITY UR: CLEAR
CO2 SERPL-SCNC: 18 MMOL/L (ref 23–29)
COCAINE UR QL SCN: NEGATIVE
COLOR UR AUTO: YELLOW
CREAT SERPL-MCNC: 1 MG/DL (ref 0.5–1.4)
CREAT UR-MCNC: 77.4 MG/DL (ref 23–375)
ERYTHROCYTE [DISTWIDTH] IN BLOOD BY AUTOMATED COUNT: 14 % (ref 11.5–14.5)
ETHANOL SERPL-MCNC: 203 MG/DL
ETHANOL SERPL-MCNC: 74 MG/DL
GFR SERPLBLD CREATININE-BSD FMLA CKD-EPI: >60 ML/MIN/1.73/M2
GLUCOSE SERPL-MCNC: 105 MG/DL (ref 70–110)
GLUCOSE UR QL STRIP: NEGATIVE
HCT VFR BLD AUTO: 41.1 % (ref 40–54)
HGB BLD-MCNC: 13.9 GM/DL (ref 14–18)
HGB UR QL STRIP: NEGATIVE
IMM GRANULOCYTES # BLD AUTO: 0.1 K/UL (ref 0–0.04)
IMM GRANULOCYTES NFR BLD AUTO: 0.7 % (ref 0–0.5)
KETONES UR QL STRIP: NEGATIVE
LEUKOCYTE ESTERASE UR QL STRIP: NEGATIVE
LYMPHOCYTES # BLD AUTO: 3.72 K/UL (ref 1–4.8)
MCH RBC QN AUTO: 31.2 PG (ref 27–31)
MCHC RBC AUTO-ENTMCNC: 33.8 G/DL (ref 32–36)
MCV RBC AUTO: 92 FL (ref 82–98)
METHADONE UR QL SCN: NEGATIVE
NITRITE UR QL STRIP: NEGATIVE
NUCLEATED RBC (/100WBC) (OHS): 0 /100 WBC
OPIATES UR QL SCN: NEGATIVE
PCP UR QL: NEGATIVE
PH UR STRIP: 6 [PH]
PLATELET # BLD AUTO: 250 K/UL (ref 150–450)
PMV BLD AUTO: 10.5 FL (ref 9.2–12.9)
POTASSIUM SERPL-SCNC: 3.2 MMOL/L (ref 3.5–5.1)
PROT SERPL-MCNC: 8.3 GM/DL (ref 6–8.4)
PROT UR QL STRIP: NEGATIVE
RBC # BLD AUTO: 4.46 M/UL (ref 4.6–6.2)
RELATIVE EOSINOPHIL (OHS): 2.8 %
RELATIVE LYMPHOCYTE (OHS): 26.4 % (ref 18–48)
RELATIVE MONOCYTE (OHS): 7.3 % (ref 4–15)
RELATIVE NEUTROPHIL (OHS): 61.9 % (ref 38–73)
SARS-COV-2 RDRP RESP QL NAA+PROBE: NEGATIVE
SODIUM SERPL-SCNC: 140 MMOL/L (ref 136–145)
SP GR UR STRIP: 1.01
UROBILINOGEN UR STRIP-ACNC: NEGATIVE EU/DL
WBC # BLD AUTO: 14.07 K/UL (ref 3.9–12.7)

## 2025-08-21 PROCEDURE — 25000003 PHARM REV CODE 250: Performed by: EMERGENCY MEDICINE

## 2025-08-21 PROCEDURE — 85025 COMPLETE CBC W/AUTO DIFF WBC: CPT | Performed by: EMERGENCY MEDICINE

## 2025-08-21 PROCEDURE — 80053 COMPREHEN METABOLIC PANEL: CPT | Performed by: EMERGENCY MEDICINE

## 2025-08-21 PROCEDURE — 80307 DRUG TEST PRSMV CHEM ANLYZR: CPT | Performed by: EMERGENCY MEDICINE

## 2025-08-21 PROCEDURE — 82077 ASSAY SPEC XCP UR&BREATH IA: CPT | Performed by: EMERGENCY MEDICINE

## 2025-08-21 PROCEDURE — 80143 DRUG ASSAY ACETAMINOPHEN: CPT | Performed by: EMERGENCY MEDICINE

## 2025-08-21 PROCEDURE — U0002 COVID-19 LAB TEST NON-CDC: HCPCS | Performed by: EMERGENCY MEDICINE

## 2025-08-21 PROCEDURE — 81003 URINALYSIS AUTO W/O SCOPE: CPT | Performed by: EMERGENCY MEDICINE

## 2025-08-21 RX ORDER — OLANZAPINE 5 MG/1
10 TABLET, ORALLY DISINTEGRATING ORAL
Status: COMPLETED | OUTPATIENT
Start: 2025-08-21 | End: 2025-08-21

## 2025-08-21 RX ORDER — POTASSIUM CHLORIDE 20 MEQ/1
40 TABLET, EXTENDED RELEASE ORAL
Status: COMPLETED | OUTPATIENT
Start: 2025-08-21 | End: 2025-08-21

## 2025-08-21 RX ADMIN — OLANZAPINE 10 MG: 5 TABLET, ORALLY DISINTEGRATING ORAL at 12:08

## 2025-08-21 RX ADMIN — POTASSIUM CHLORIDE 40 MEQ: 1500 TABLET, EXTENDED RELEASE ORAL at 02:08

## 2025-08-22 PROBLEM — E87.6 HYPOKALEMIA: Status: ACTIVE | Noted: 2025-08-22

## 2025-08-22 PROBLEM — H40.9 GLAUCOMA: Status: ACTIVE | Noted: 2025-08-22

## 2025-08-22 PROBLEM — Z13.9 ENCOUNTER FOR MEDICAL SCREENING EXAMINATION: Status: ACTIVE | Noted: 2025-07-20

## 2025-08-22 PROBLEM — D72.829 ELEVATED WBC COUNT: Status: ACTIVE | Noted: 2025-08-22

## 2025-08-22 PROBLEM — D64.9 ANEMIA: Status: ACTIVE | Noted: 2025-08-22

## 2025-08-22 LAB — HOLD SPECIMEN: NORMAL
